# Patient Record
Sex: FEMALE | Race: WHITE | Employment: OTHER | URBAN - METROPOLITAN AREA
[De-identification: names, ages, dates, MRNs, and addresses within clinical notes are randomized per-mention and may not be internally consistent; named-entity substitution may affect disease eponyms.]

---

## 2018-04-04 ENCOUNTER — TRANSFERRED RECORDS (OUTPATIENT)
Dept: HEALTH INFORMATION MANAGEMENT | Facility: CLINIC | Age: 83
End: 2018-04-04

## 2018-04-19 ENCOUNTER — APPOINTMENT (OUTPATIENT)
Dept: GENERAL RADIOLOGY | Facility: CLINIC | Age: 83
End: 2018-04-19
Attending: EMERGENCY MEDICINE
Payer: MEDICARE

## 2018-04-19 ENCOUNTER — HOSPITAL ENCOUNTER (OUTPATIENT)
Facility: CLINIC | Age: 83
Setting detail: OBSERVATION
Discharge: HOME | End: 2018-04-20
Attending: EMERGENCY MEDICINE | Admitting: INTERNAL MEDICINE
Payer: MEDICARE

## 2018-04-19 DIAGNOSIS — I47.10 SVT (SUPRAVENTRICULAR TACHYCARDIA) (H): ICD-10-CM

## 2018-04-19 DIAGNOSIS — R07.89 OTHER CHEST PAIN: ICD-10-CM

## 2018-04-19 LAB
ANION GAP SERPL CALCULATED.3IONS-SCNC: 15 MMOL/L (ref 3–14)
BASOPHILS # BLD AUTO: 0.1 10E9/L (ref 0–0.2)
BASOPHILS NFR BLD AUTO: 0.5 %
BUN SERPL-MCNC: 25 MG/DL (ref 7–30)
CALCIUM SERPL-MCNC: 9.1 MG/DL (ref 8.5–10.1)
CHLORIDE SERPL-SCNC: 109 MMOL/L (ref 94–109)
CO2 SERPL-SCNC: 21 MMOL/L (ref 20–32)
CREAT SERPL-MCNC: 1 MG/DL (ref 0.52–1.04)
DIFFERENTIAL METHOD BLD: ABNORMAL
EOSINOPHIL # BLD AUTO: 0.3 10E9/L (ref 0–0.7)
EOSINOPHIL NFR BLD AUTO: 2.1 %
ERYTHROCYTE [DISTWIDTH] IN BLOOD BY AUTOMATED COUNT: 12.8 % (ref 10–15)
GFR SERPL CREATININE-BSD FRML MDRD: 53 ML/MIN/1.7M2
GLUCOSE SERPL-MCNC: 196 MG/DL (ref 70–99)
HCT VFR BLD AUTO: 42.1 % (ref 35–47)
HGB BLD-MCNC: 14.1 G/DL (ref 11.7–15.7)
IMM GRANULOCYTES # BLD: 0 10E9/L (ref 0–0.4)
IMM GRANULOCYTES NFR BLD: 0.3 %
LYMPHOCYTES # BLD AUTO: 2.3 10E9/L (ref 0.8–5.3)
LYMPHOCYTES NFR BLD AUTO: 19.8 %
MCH RBC QN AUTO: 30 PG (ref 26.5–33)
MCHC RBC AUTO-ENTMCNC: 33.5 G/DL (ref 31.5–36.5)
MCV RBC AUTO: 90 FL (ref 78–100)
MONOCYTES # BLD AUTO: 0.8 10E9/L (ref 0–1.3)
MONOCYTES NFR BLD AUTO: 6.4 %
NEUTROPHILS # BLD AUTO: 8.4 10E9/L (ref 1.6–8.3)
NEUTROPHILS NFR BLD AUTO: 70.9 %
NRBC # BLD AUTO: 0 10*3/UL
NRBC BLD AUTO-RTO: 0 /100
PLATELET # BLD AUTO: 272 10E9/L (ref 150–450)
POTASSIUM SERPL-SCNC: 4.3 MMOL/L (ref 3.4–5.3)
RBC # BLD AUTO: 4.7 10E12/L (ref 3.8–5.2)
SODIUM SERPL-SCNC: 145 MMOL/L (ref 133–144)
TROPONIN I SERPL-MCNC: 0.77 UG/L (ref 0–0.04)
TROPONIN I SERPL-MCNC: <0.015 UG/L (ref 0–0.04)
TSH SERPL DL<=0.005 MIU/L-ACNC: 3.38 MU/L (ref 0.4–4)
WBC # BLD AUTO: 11.8 10E9/L (ref 4–11)

## 2018-04-19 PROCEDURE — 96361 HYDRATE IV INFUSION ADD-ON: CPT | Performed by: EMERGENCY MEDICINE

## 2018-04-19 PROCEDURE — 93005 ELECTROCARDIOGRAM TRACING: CPT | Performed by: EMERGENCY MEDICINE

## 2018-04-19 PROCEDURE — 99285 EMERGENCY DEPT VISIT HI MDM: CPT | Mod: 25 | Performed by: EMERGENCY MEDICINE

## 2018-04-19 PROCEDURE — 36415 COLL VENOUS BLD VENIPUNCTURE: CPT | Performed by: STUDENT IN AN ORGANIZED HEALTH CARE EDUCATION/TRAINING PROGRAM

## 2018-04-19 PROCEDURE — 84443 ASSAY THYROID STIM HORMONE: CPT | Performed by: EMERGENCY MEDICINE

## 2018-04-19 PROCEDURE — 21400006 ZZH R&B CCU INTERMEDIATE UMMC

## 2018-04-19 PROCEDURE — 85025 COMPLETE CBC W/AUTO DIFF WBC: CPT | Performed by: EMERGENCY MEDICINE

## 2018-04-19 PROCEDURE — 25000128 H RX IP 250 OP 636

## 2018-04-19 PROCEDURE — 84484 ASSAY OF TROPONIN QUANT: CPT | Mod: 91 | Performed by: STUDENT IN AN ORGANIZED HEALTH CARE EDUCATION/TRAINING PROGRAM

## 2018-04-19 PROCEDURE — 93010 ELECTROCARDIOGRAM REPORT: CPT | Mod: Z6 | Performed by: EMERGENCY MEDICINE

## 2018-04-19 PROCEDURE — 84484 ASSAY OF TROPONIN QUANT: CPT | Performed by: EMERGENCY MEDICINE

## 2018-04-19 PROCEDURE — 25000132 ZZH RX MED GY IP 250 OP 250 PS 637: Performed by: EMERGENCY MEDICINE

## 2018-04-19 PROCEDURE — 71046 X-RAY EXAM CHEST 2 VIEWS: CPT

## 2018-04-19 PROCEDURE — 25000128 H RX IP 250 OP 636: Performed by: EMERGENCY MEDICINE

## 2018-04-19 PROCEDURE — 96374 THER/PROPH/DIAG INJ IV PUSH: CPT | Performed by: EMERGENCY MEDICINE

## 2018-04-19 PROCEDURE — 80048 BASIC METABOLIC PNL TOTAL CA: CPT | Performed by: EMERGENCY MEDICINE

## 2018-04-19 PROCEDURE — 25000125 ZZHC RX 250: Performed by: EMERGENCY MEDICINE

## 2018-04-19 PROCEDURE — 99291 CRITICAL CARE FIRST HOUR: CPT | Mod: 25 | Performed by: EMERGENCY MEDICINE

## 2018-04-19 RX ORDER — CARBOXYMETHYLCELLULOSE SODIUM 5 MG/ML
1 SOLUTION/ DROPS OPHTHALMIC AT BEDTIME
COMMUNITY

## 2018-04-19 RX ORDER — PRAVASTATIN SODIUM 80 MG/1
80 TABLET ORAL AT BEDTIME
COMMUNITY
Start: 2017-10-05

## 2018-04-19 RX ORDER — PRAVASTATIN SODIUM 40 MG
80 TABLET ORAL AT BEDTIME
Status: DISCONTINUED | OUTPATIENT
Start: 2018-04-19 | End: 2018-04-20 | Stop reason: HOSPADM

## 2018-04-19 RX ORDER — LEVOTHYROXINE SODIUM 25 UG/1
25 TABLET ORAL DAILY
Status: DISCONTINUED | OUTPATIENT
Start: 2018-04-20 | End: 2018-04-20 | Stop reason: HOSPADM

## 2018-04-19 RX ORDER — SODIUM CHLORIDE 9 MG/ML
INJECTION, SOLUTION INTRAVENOUS
Status: COMPLETED
Start: 2018-04-19 | End: 2018-04-19

## 2018-04-19 RX ORDER — ACETAMINOPHEN 325 MG/1
650 TABLET ORAL EVERY 4 HOURS PRN
Status: CANCELLED | OUTPATIENT
Start: 2018-04-19

## 2018-04-19 RX ORDER — ACETAMINOPHEN 325 MG/1
650 TABLET ORAL EVERY 4 HOURS PRN
Status: DISCONTINUED | OUTPATIENT
Start: 2018-04-19 | End: 2018-04-20 | Stop reason: HOSPADM

## 2018-04-19 RX ORDER — DILTIAZEM HYDROCHLORIDE 5 MG/ML
20 INJECTION INTRAVENOUS ONCE
Status: COMPLETED | OUTPATIENT
Start: 2018-04-19 | End: 2018-04-19

## 2018-04-19 RX ORDER — MAGNESIUM HYDROXIDE 400 MG/5ML
1 SUSPENSION, ORAL (FINAL DOSE FORM) ORAL DAILY
COMMUNITY
Start: 2012-05-02 | End: 2018-04-19

## 2018-04-19 RX ORDER — ACETAMINOPHEN 650 MG/1
650 SUPPOSITORY RECTAL EVERY 4 HOURS PRN
Status: DISCONTINUED | OUTPATIENT
Start: 2018-04-19 | End: 2018-04-20 | Stop reason: HOSPADM

## 2018-04-19 RX ORDER — VIT C/E/ZN/COPPR/LUTEIN/ZEAXAN 60 MG-6 MG
1 CAPSULE ORAL 2 TIMES DAILY
COMMUNITY

## 2018-04-19 RX ORDER — ASPIRIN 325 MG
325 TABLET ORAL ONCE
Status: COMPLETED | OUTPATIENT
Start: 2018-04-19 | End: 2018-04-19

## 2018-04-19 RX ORDER — TRAZODONE HYDROCHLORIDE 50 MG/1
50 TABLET, FILM COATED ORAL
COMMUNITY
Start: 2017-07-27 | End: 2018-07-27

## 2018-04-19 RX ORDER — MULTIVIT-MIN/IRON/FOLIC ACID/K 18-600-40
1 CAPSULE ORAL DAILY
COMMUNITY

## 2018-04-19 RX ORDER — LIDOCAINE 40 MG/G
CREAM TOPICAL
Status: CANCELLED | OUTPATIENT
Start: 2018-04-19

## 2018-04-19 RX ORDER — CALCIUM CARBONATE 500 MG/1
2 TABLET, CHEWABLE ORAL DAILY
COMMUNITY

## 2018-04-19 RX ORDER — IBUPROFEN 200 MG
400 TABLET ORAL EVERY 12 HOURS
COMMUNITY

## 2018-04-19 RX ORDER — NALOXONE HYDROCHLORIDE 0.4 MG/ML
.1-.4 INJECTION, SOLUTION INTRAMUSCULAR; INTRAVENOUS; SUBCUTANEOUS
Status: CANCELLED | OUTPATIENT
Start: 2018-04-19

## 2018-04-19 RX ORDER — LIDOCAINE 40 MG/G
CREAM TOPICAL
Status: DISCONTINUED | OUTPATIENT
Start: 2018-04-19 | End: 2018-04-20 | Stop reason: HOSPADM

## 2018-04-19 RX ORDER — TRAZODONE HYDROCHLORIDE 50 MG/1
50 TABLET, FILM COATED ORAL
Status: DISCONTINUED | OUTPATIENT
Start: 2018-04-19 | End: 2018-04-20 | Stop reason: HOSPADM

## 2018-04-19 RX ORDER — ACETAMINOPHEN 650 MG/1
650 SUPPOSITORY RECTAL EVERY 4 HOURS PRN
Status: CANCELLED | OUTPATIENT
Start: 2018-04-19

## 2018-04-19 RX ORDER — SODIUM PHOSPHATE,MONO-DIBASIC 19G-7G/118
1 ENEMA (ML) RECTAL DAILY
COMMUNITY

## 2018-04-19 RX ORDER — LEVOTHYROXINE SODIUM 25 UG/1
25 TABLET ORAL DAILY
COMMUNITY
Start: 2017-10-04 | End: 2018-10-04

## 2018-04-19 RX ORDER — ADENOSINE 3 MG/ML
INJECTION, SOLUTION INTRAVENOUS
Status: DISCONTINUED
Start: 2018-04-19 | End: 2018-04-19 | Stop reason: WASHOUT

## 2018-04-19 RX ADMIN — DILTIAZEM HYDROCHLORIDE 15 MG: 5 INJECTION INTRAVENOUS at 13:40

## 2018-04-19 RX ADMIN — ASPIRIN 325 MG ORAL TABLET 325 MG: 325 PILL ORAL at 13:40

## 2018-04-19 RX ADMIN — SODIUM CHLORIDE 1000 ML: 9 INJECTION, SOLUTION INTRAVENOUS at 15:14

## 2018-04-19 RX ADMIN — SODIUM CHLORIDE 1000 ML: 9 INJECTION, SOLUTION INTRAVENOUS at 13:31

## 2018-04-19 ASSESSMENT — ENCOUNTER SYMPTOMS: LIGHT-HEADEDNESS: 1

## 2018-04-19 NOTE — PHARMACY-ADMISSION MEDICATION HISTORY
Admission medication history for the April 19, 2018 admission is complete.     Interview sources:  Patient, [facility] (Mail Order; [phone])    Reliability of source: Moderate - patient knew the names of most medications, but was unsure of doses; verified all with  pharmacy and care everywhere    Medication compliance: good - per patient report    Changes made to PTA medication list (reason)  Added: all medications listed (14 total)  Deleted: prednisolone eye drops (therapy complete in 2015)  Changed: none    Additional medication history information:   - Patient is prescribed 4 prescription medications (sertraline, pravastatin, levothyroxine, trazodone), all others listed below are purchases OTC  - Eylea - pt reports receiving an injection monthly for her eye, this is managed by [facility]       Prior to Admission medications    Medication Sig Last Dose Taking? Auth Provider   Aflibercept (EYLEA) 2 MG/0.05ML SOLN injection 2 mg by Intravitreal route every 28 days (Injection given in clinic - managed by [facility]) Past Month Yes Unknown, Entered By History   calcium carbonate (TUMS) 500 MG chewable tablet Take 2 chew tab by mouth daily (for calcium supplementation) 4/19/2018 at AM Yes Unknown, Entered By History   Carboxymethylcellulose Sod PF (REFRESH PLUS) 0.5 % SOLN ophthalmic solution Place 1 drop into both eyes At Bedtime 4/18/2018 at PM Yes Unknown, Entered By History   glucosamine-chondroitin 500-400 MG CAPS per capsule Take 1 capsule by mouth daily 4/19/2018 at AM Yes Unknown, Entered By History   ibuprofen (ADVIL/MOTRIN) 200 MG tablet Take 400 mg by mouth every 12 hours 4/19/2018 at AM Yes Unknown, Entered By History   levothyroxine (SYNTHROID/LEVOTHROID) 25 MCG tablet Take 25 mcg by mouth daily 4/19/2018 at AM Yes Unknown, Entered By History   Multiple Vitamins-Minerals (CENTRUM SILVER 50+WOMEN) TABS Take 1 tablet by mouth daily 4/19/2018 at AM Yes Unknown,  Entered By History   multivitamin  with lutein (OCUVITE WITH LTEIN) CAPS per capsule Take 1 capsule by mouth 2 times daily (patient purchases TEBS brand eye vitamins)  4/19/2018 at AM Yes Unknown, Entered By History   Omega-3 Fatty Acids (FISH OIL OMEGA-3 PO) Take 1 capsule by mouth daily 4/19/2018 at AM Yes Unknown, Entered By History   polyethylene glycol 0.4%- propylene glycol 0.3% (SYSTANE ULTRA) 0.4-0.3 % SOLN ophthalmic solution Place 1 drop into both eyes every morning 4/19/2018 at AM Yes Unknown, Entered By History   pravastatin (PRAVACHOL) 80 MG tablet Take 80 mg by mouth At Bedtime 4/18/2018 at PM Yes Unknown, Entered By History   sertraline (ZOLOFT) 50 MG tablet Take 50 mg by mouth every morning 4/19/2018 at AM Yes Unknown, Entered By History   traZODone (DESYREL) 50 MG tablet Take 50 mg by mouth nightly as needed for sleep 4/18/2018 at PM Yes Unknown, Entered By History   Vitamin D, Cholecalciferol, 1000 units TABS Take 1 tablet by mouth daily 4/19/2018 at AM Yes Unknown, Entered By History       Time spent: 50 minutes    Medication history completed by:   Filomena Palma, PharmD  [facility] [place]  Available daily from 1-9 PM: phone [phone], pager [phone]

## 2018-04-19 NOTE — IP AVS SNAPSHOT
MRN:[id number]                      After Visit Summary   4/19/2018    Guido Larkin    MRN: [id number]           Thank you!     Thank you for choosing [facility] for your care. Our goal is always to provide you with excellent care. Hearing back from our patients is one way we can continue to improve our services. Please take a few minutes to complete the written survey that you may receive in the mail after you visit with us. Thank you!        Patient Information     Date Of Birth          1935        Designated Caregiver       Most Recent Value    Caregiver    Will someone help with your care after discharge? yes    Name of designated caregiver Nasreen    Phone number of caregiver [phone]    Caregiver address [address] [facility] [address]      About your hospital stay     You were admitted on:  April 19, 2018 You last received care in the:  [address] [facility]    You were discharged on:  April 20, 2018        Reason for your hospital stay       SVT    You were brought to the hospital because you had a fast heart rate. This converted out on its own by taking a deep breath. This sometimes happens with the type of rhythm you had and likely you were born with it. Tricks to be done if it happens again are: drinking a glass of cold water, taking a deep breath, bearing down, splashing cold water on your face. We started a medication called metoprolol 25mg once a day. Take it at night if it is making you tired. It helps the heart rate go slower.                  Who to Call     For medical emergencies, please call 911.  For non-urgent questions about your medical care, please call your primary care provider or clinic, [phone]          Attending Provider     Provider Specialty    Hany Lofton MD Emergency Medicine    Penelope Simmosn MD Cardiology       Primary Care Provider Office Phone # Fax #    Sanjuanita Irving [phone]      After Care Instructions   "   Activity       Your activity upon discharge: activity as tolerated            Diet       Follow this diet upon discharge: Orders Placed This Encounter      Regular Diet Adult                  Follow-up Appointments     Adult [facility] Follow-up and recommended labs and tests       Follow up with Dr. Penelope Simmons , at (location with clinic name or city) [facility], within a month  to evaluate medication change. No follow up labs or test are needed.    Appointments on [place] and/or [facility] (with [facility] or [facility] provider or service). Call [phone] if you haven't heard regarding these appointments within 7 days of discharge.                  Pending Results     No orders found for last 3 day(s).            Statement of Approval     Ordered          04/20/18 1148  I have reviewed and agree with all the recommendations and orders detailed in this document.  EFFECTIVE NOW     Approved and electronically signed by:  Leonila Leyva MD             Admission Information     Date & Time Provider Department Dept. Phone    4/19/2018 Penelope Simmons MD [address] [facility] [place] [phone]      Your Vitals Were     Blood Pressure Pulse Temperature Respirations Height Weight    128/61 (BP Location: Left arm) 97 98.5  F (36.9  C) (Oral) 18 1.707 m (5' 7.2\") 65.2 kg (143 lb 11.2 oz)    Pulse Oximetry BMI (Body Mass Index)                90% 22.37 kg/m2          [employer] Information     [employer] lets you send messages to your doctor, view your test results, renew your prescriptions, schedule appointments and more. To sign up, go to [email or web address] . Click on \"Log in\" on the left side of the screen, which will take you to the Welcome page. Then click on \"Sign up Now\" on the right side of the page.     You will be asked to enter the access code listed below, as well as some personal information. Please follow the directions to create your username and password.     Your access code is: [email or web address]  Expires: " 2018 11:50 AM     Your access code will  in 90 days. If you need help or a new code, please call your [facility] clinic or [phone].        Care EveryWhere ID     This is your Care EveryWhere ID. This could be used by other organizations to access your [facility] medical records  [id number]        Equal Access to Services     STEPHANIELALY ROBY : Hadii solo kumari hadcucao Soyueali, waaxda luqadaha, qaybta kaalmada samanthacesiliaalfie, alana leonolgacholo maddox . So [facility] [phone].    ATENCIÓN: Si habla español, tiene a gtz disposición servicios gratuitos de asistencia lingüística. Violette al [phone].    We comply with applicable federal civil rights laws and [place] laws. We do not discriminate on the basis of race, color, national origin, age, disability, sex, sexual orientation, or gender identity.               Review of your medicines      START taking        Dose / Directions    metoprolol succinate 25 MG 24 hr tablet   Commonly known as:  TOPROL-XL        Dose:  25 mg   Start taking on:  2018   Take 1 tablet (25 mg) by mouth daily   Quantity:  30 tablet   Refills:  3         CONTINUE these medicines which have NOT CHANGED        Dose / Directions    calcium carbonate 500 MG chewable tablet   Commonly known as:  TUMS        Dose:  2 chew tab   Take 2 chew tab by mouth daily (for calcium supplementation)   Refills:  0       Carboxymethylcellulose Sod PF 0.5 % Soln ophthalmic solution   Commonly known as:  REFRESH PLUS        Dose:  1 drop   Place 1 drop into both eyes At Bedtime   Refills:  0       * CENTRUM SILVER 50+WOMEN Tabs        Dose:  1 tablet   Take 1 tablet by mouth daily   Refills:  0       * multivitamin  with lutein Caps per capsule        Dose:  1 capsule   Take 1 capsule by mouth 2 times daily (patient purchases TEBS brand eye vitamins)   Refills:  0       EYLEA 2 MG/0.05ML Soln injection   Generic drug:  Aflibercept        Dose:  2 mg   2 mg by Intravitreal route every 28 days  (Injection given in clinic - managed by [facility])   Refills:  0       FISH OIL OMEGA-3 PO        Dose:  1 capsule   Take 1 capsule by mouth daily   Refills:  0       glucosamine-chondroitin 500-400 MG Caps per capsule        Dose:  1 capsule   Take 1 capsule by mouth daily   Refills:  0       ibuprofen 200 MG tablet   Commonly known as:  ADVIL/MOTRIN        Dose:  400 mg   Take 400 mg by mouth every 12 hours   Refills:  0       levothyroxine 25 MCG tablet   Commonly known as:  SYNTHROID/LEVOTHROID        Dose:  25 mcg   Take 25 mcg by mouth daily   Refills:  0       polyethylene glycol 0.4%- propylene glycol 0.3% 0.4-0.3 % Soln ophthalmic solution   Commonly known as:  SYSTANE ULTRA        Dose:  1 drop   Place 1 drop into both eyes every morning   Refills:  0       pravastatin 80 MG tablet   Commonly known as:  PRAVACHOL        Dose:  80 mg   Take 80 mg by mouth At Bedtime   Refills:  0       sertraline 50 MG tablet   Commonly known as:  ZOLOFT        Dose:  50 mg   Take 50 mg by mouth every morning   Refills:  0       traZODone 50 MG tablet   Commonly known as:  DESYREL        Dose:  50 mg   Take 50 mg by mouth nightly as needed for sleep   Refills:  0       Vitamin D (Cholecalciferol) 1000 units Tabs        Dose:  1 tablet   Take 1 tablet by mouth daily   Refills:  0       * Notice:  This list has 2 medication(s) that are the same as other medications prescribed for you. Read the directions carefully, and ask your doctor or other care provider to review them with you.         Where to get your medicines      Some of these will need a paper prescription and others can be bought over the counter. Ask your nurse if you have questions.     Bring a paper prescription for each of these medications     metoprolol succinate 25 MG 24 hr tablet                Protect others around you: Learn how to safely use, store and throw away your medicines at www.disposemymeds.org.             Medication List: This  is a list of all your medications and when to take them. Check marks below indicate your daily home schedule. Keep this list as a reference.      Medications           Morning Afternoon Evening Bedtime As Needed    calcium carbonate 500 MG chewable tablet   Commonly known as:  TUMS   Take 2 chew tab by mouth daily (for calcium supplementation)                                Carboxymethylcellulose Sod PF 0.5 % Soln ophthalmic solution   Commonly known as:  REFRESH PLUS   Place 1 drop into both eyes At Bedtime                                * CENTRUM SILVER 50+WOMEN Tabs   Take 1 tablet by mouth daily                                * multivitamin  with lutein Caps per capsule   Take 1 capsule by mouth 2 times daily (patient purchases [employer] brand eye vitamins)                                EYLEA 2 MG/0.05ML Soln injection   2 mg by Intravitreal route every 28 days (Injection given in clinic - managed by [facility])   Generic drug:  Aflibercept                                FISH OIL OMEGA-3 PO   Take 1 capsule by mouth daily                                glucosamine-chondroitin 500-400 MG Caps per capsule   Take 1 capsule by mouth daily                                ibuprofen 200 MG tablet   Commonly known as:  ADVIL/MOTRIN   Take 400 mg by mouth every 12 hours                                levothyroxine 25 MCG tablet   Commonly known as:  SYNTHROID/LEVOTHROID   Take 25 mcg by mouth daily   Last time this was given:  25 mcg on 4/20/2018  8:03 AM                                metoprolol succinate 25 MG 24 hr tablet   Commonly known as:  TOPROL-XL   Take 1 tablet (25 mg) by mouth daily   Start taking on:  4/21/2018   Last time this was given:  25 mg on 4/20/2018  8:03 AM                                polyethylene glycol 0.4%- propylene glycol 0.3% 0.4-0.3 % Soln ophthalmic solution   Commonly known as:  SYSTANE ULTRA   Place 1 drop into both eyes every morning                                 pravastatin 80 MG tablet   Commonly known as:  PRAVACHOL   Take 80 mg by mouth At Bedtime                                sertraline 50 MG tablet   Commonly known as:  ZOLOFT   Take 50 mg by mouth every morning   Last time this was given:  50 mg on 4/20/2018  8:03 AM                                traZODone 50 MG tablet   Commonly known as:  DESYREL   Take 50 mg by mouth nightly as needed for sleep   Last time this was given:  50 mg on 4/20/2018 12:11 AM                                Vitamin D (Cholecalciferol) 1000 units Tabs   Take 1 tablet by mouth daily                                * Notice:  This list has 2 medication(s) that are the same as other medications prescribed for you. Read the directions carefully, and ask your doctor or other care provider to review them with you.

## 2018-04-19 NOTE — H&P
"General acute hospital, [place]    History and Physical  Cardiology 1     Date of Admission:  4/19/2018    Assessment & Plan   Guido Larkin is a 83 year old female with history of hypothyroidism who presented with chest pressure, jaw pressure and pre-syncope, found to have SVT, which converted with vagal manuever.      Presyncope, Chest Pressure 2/2 SVT  Demand ischemia  - Unclear trigger for SVT.  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  TSH normal.   - Telemetry  - Troponin peaked, likely from SVT.    - Formal echo pending.  Bedside echo shows normal EF.    - Start metoprolol tart 25    Chronic issues:  HLD: Statin, asa 325  Anxiety: SSRI  Macular degeneration: Eye drops   Hypothyroidism: LT4 25mcg  Insomnia: trazodone    FEN: Regular  CODE: Full code  PPX: Ambulate  DISPO: Observation, anticipate dc in AM     To be discussed in the AM.    Iggy Kahn DO, MS  Internal Medicine, PGY-2  Pager [phone]      =======================================================================  =======================================================================  =======================================================================    Primary Care Physician   Sanjuanita Irving    Chief Complaint   Sudden onset shortness of breath, chest pain, dizziness    History is obtained from the patient    History of Present Illness   Guido Larkin is a 83 year old female with hypothyroidism who presents with abrupt onset shortness chest pressure and feeling like she was going to \"pass out\".  She felt the chest pressure in upper chest and below her jaw.  She  Went to get a massage thinking that would help her.  However, that didn't help.  Then she came to the ED.  There took some really deep breaths, on her own, and spontaneously converted to NSR.  She says she has otherwise been in her usual state of health.  This morning before this episode she had strained to have bowel movement but she " always has to strain to have bowel movement and this morning was not unusual.  Otherwise, can't think of anything different.       A complete ROS is otherwise negative.     Past Medical History    Reviewed    Past Surgical History   Reviewed    Meds:  Reviewed    Allergies:   reviewed    Social History   Very self sufficient and active -- [employer] in [place] winter and [place] in the summer.  Avid golfer.  Quit smoking 40 years ago.  Drinks 1.5 shot of scotch every day.  No illicits.       Family History    No FH of arrhythmia.       Review of Systems   The 10 point Review of Systems is negative other than noted in the HPI.     Physical Exam       BP: 102/55 Pulse: 97 Heart Rate: 60 Resp: 24 SpO2: 98 % O2 Device: None (Room air)    Vital Signs with Ranges  Pulse:  [97] 97  Heart Rate:  [] 60  Resp:  [11-27] 24  BP: ()/(51-75) 102/55  SpO2:  [81 %-100 %] 98 %  0 lbs 0 oz    Constitutional: NAD  Eyes: Anicteric  ENT: No JVD  Respiratory: bibasilar crackles, but comfortable on RA  Cardiovascular: RRR,   Bedside US: normal EF, no effusion, IVC not dilated and with good resp variation  GI: soft, NTND  Skin: no rash  Neurologic: Grossly non-focal  Neuropsychiatric: Alert and oriented    Labs:  Trop peaked at 0.769, CXR ok, EKG: SVT and then sinus arrhythmia

## 2018-04-19 NOTE — IP AVS SNAPSHOT
[address] [address]    Phone:  [phone]                                       After Visit Summary   4/19/2018    Guido Larkin    MRN: [id number]           After Visit Summary Signature Page     I have received my discharge instructions, and my questions have been answered. I have discussed any challenges I see with this plan with the nurse or doctor.    ..........................................................................................................................................  Patient/Patient Representative Signature      ..........................................................................................................................................  Patient Representative Print Name and Relationship to Patient    ..................................................               ................................................  Date                                            Time    ..........................................................................................................................................  Reviewed by Signature/Title    ...................................................              ..............................................  Date                                                            Time

## 2018-04-19 NOTE — LETTER
"Transition Communication Hand-off for Care Transitions to Next Level of Care Provider    Name: Guido Larkin  : 1935  MRN #: [id number]  Primary Care Provider: Sanjuanita Irving     [address]  [facility] [address]     Reason for Hospitalization:  Other chest pain [R07.89]  SVT (supraventricular tachycardia) (H) [I47.1]  Admit Date/Time: 2018  1:07 PM  Discharge Date: ***  Payor Source: Payor: [facility] / Plan: [employer] PLAN / Product Type: HMO /     Readmission Assessment Measure (DAI) Risk Score/category: ***    Plan of Care Goals/Milestone Events:   Patient Concerns: ***   Patient Goals:   Short-term ***   Long-term ***   Medical Goals   Short-term ***   Long-term ***         Reason for Communication Hand-off Referral: {CCREASONCMCTNHANDOFFREFERRALCTS:87539}    Discharge Plan:       Concern for non-adherence with plan of care:   Y/N ***  Discharge Needs Assessment:      Already enrolled in Tele-monitoring program and name of program:  ***  Follow-up specialty is recommended: {YES / NO:48047::\"Yes\"}    Follow-up plan:  No future appointments.    Any outstanding tests or procedures:              Key Recommendations:      Leana Lazcano    AVS/Discharge Summary is the source of truth; this is a helpful guide for improved communication of patient story          "

## 2018-04-19 NOTE — ED PROVIDER NOTES
"    [facility] EMERGENCY DEPARTMENT ([facility])    4/19/18     ED 1  1:12 PM   History     Chief Complaint   Patient presents with     Chest Pain     disoriented, general malaise, SOB, near syncope, chest heaviness; started 1 hour ago      The history is provided by the patient and medical records.     Guido Larkin is a 83 year old female who presents with sense of generalized malaise and shortness of breath that started approximately 1 hour prior to arrival (noon today).  She has a history of hyperlipidemia, hypothyroidism, depression and anxiety. She notes an episode of heart racing and panic attack while vacationing in [place]. She states symptoms lasted for 2 days and then spontaneously resolved at that time. She did not get these symptoms evaluated. At around noon today she noticed progressively worsening lightheadedness, near syncope and chest heaviness. She is able to breathe deep \"but it's not deep deep.\" No shortness of breath otherwise. She denies any inadvertent medication overdose. She does drink 1-1/2 of good scotch every day but no other substance use. She is not on any anticoagulation. No prior cardiac history. She has some seasonal allergies, is otherwise healthy. She does receive injections for macular degeneration.     Patient has primary care at [facility], had routine physical exam 11 days ago that was normal.     I have reviewed the Medications, Allergies, Past Medical and Surgical History, and Social History in the Epic system.    Review of Systems   Cardiovascular: Positive for chest pain (chest pressure).   Neurological: Positive for light-headedness.       Physical Exam   BP: 93/75  Pulse: 97  Heart Rate: 194  Temp: 98.9  F (37.2  C)  Resp: 18  Height: 170.7 cm (5' 7.2\")  Weight: 66.7 kg (147 lb)  SpO2: 94 %      Physical Exam Exam:  Constitutional: healthy, alert and no distress  Head: Normocephalic. No masses, lesions, tenderness or abnormalities  Neck: Neck supple. No " adenopathy. Thyroid symmetric, normal size,, Carotids without bruits.  ENT: ENT exam normal, no neck nodes or sinus tenderness  Cardiovascular: Tachy and regular otherwise negative, PMI normal. No lifts, heaves, or thrills. No murmurs, clicks gallops or rub  Respiratory: negative, Percussion normal. Good diaphragmatic excursion. Lungs clear  Gastrointestinal: Abdomen soft, non-tender. BS normal. No masses, organomegaly  : Deferred  Musculoskeletal: extremities normal- no gross deformities noted, gait normal and normal muscle tone  Skin: no suspicious lesions or rashes  Neurologic: Gait normal. Reflexes normal and symmetric. Sensation grossly WNL.  Psychiatric: mentation appears normal and affect normal/bright  Hematologic/Lymphatic/Immunologic: Normal cervical lymph nodes      ED Course     ED Course     Procedures             EKG Interpretation:      Interpreted by Hany Lofton MD  Time reviewed: 1336  Symptoms at time of EKG: lightheadedness, near syncope   Rhythm: sinus arrhythmia  Rate: 73  Axis: Normal  Ectopy: none  Conduction: normal  ST Segments/ T Waves: No ST-T wave changes  Q Waves: none  Comparison to prior: No old EKG available    Clinical Impression: sinus arrhythmia    Critical Care time:  was 30 minutes for this patient excluding procedures.       Labs Ordered and Resulted from Time of ED Arrival Up to the Time of Departure from the ED   CBC WITH PLATELETS DIFFERENTIAL - Abnormal; Notable for the following:        Result Value    WBC 11.8 (*)     Absolute Neutrophil 8.4 (*)     All other components within normal limits   BASIC METABOLIC PANEL - Abnormal; Notable for the following:     Sodium 145 (*)     Anion Gap 15 (*)     Glucose 196 (*)     GFR Estimate 53 (*)     All other components within normal limits   TROPONIN I - Abnormal; Notable for the following:     Troponin I ES 0.769 (*)     All other components within normal limits   TROPONIN I   TSH WITH FREE T4 REFLEX            Assessments & Plan  (with Medical Decision Making)   This is a 83-year-old female with no prior medical history except for hypercholesterolemia and hypothyroid for which she takes her medications and has had no recent changes.  For about 1 hour she is having this palpitations and chest pain described as chest heaviness.  She is also feeling some shortness of breath as well as lightheadedness.  She describes no recent history of illness.  Physical exam is unremarkable except for tachycardia.    Given her history and current findings I am concerned for SVT.  In fact, EKG shows SVT and heart rate around 180s-190s.  While in the middle of evaluation, patient was able to break her SVT on her own.  Repeat EKG does show evidence of sinus rhythm with rate in the 70s.  Her initial EKG was concerning for inferior lateral ischemia  with a heart rate in the 180s-190s.  I am concerned for possible SVT  recurrence as well as possible stable angina.  Patient will be admitted to Cardiology.    I have reviewed the nursing notes.    I have reviewed the findings, diagnosis, plan and need for follow up with the patient.    Current Discharge Medication List          Final diagnoses:   Other chest pain   SVT (supraventricular tachycardia) (H)     Jaclyn HOPPER, am serving as a trained medical scribe to document services personally performed by Hany Lofton MD based on the provider's statements to me on April 19, 2018.  This document has been checked and approved by the attending provider.    Hany HOPPER MD, was physically present and have reviewed and verified the accuracy of this note documented by Jaclyn Hunt medical scribe.     4/19/2018   [facility], [place], EMERGENCY DEPARTMENT     Hany Lofton MD  04/20/18 1038

## 2018-04-20 VITALS
BODY MASS INDEX: 22.55 KG/M2 | HEIGHT: 67 IN | HEART RATE: 97 BPM | WEIGHT: 143.7 LBS | RESPIRATION RATE: 18 BRPM | TEMPERATURE: 98.5 F | OXYGEN SATURATION: 90 % | DIASTOLIC BLOOD PRESSURE: 61 MMHG | SYSTOLIC BLOOD PRESSURE: 128 MMHG

## 2018-04-20 LAB
ANION GAP SERPL CALCULATED.3IONS-SCNC: 6 MMOL/L (ref 3–14)
BUN SERPL-MCNC: 16 MG/DL (ref 7–30)
CALCIUM SERPL-MCNC: 8.3 MG/DL (ref 8.5–10.1)
CHLORIDE SERPL-SCNC: 112 MMOL/L (ref 94–109)
CO2 SERPL-SCNC: 25 MMOL/L (ref 20–32)
CREAT SERPL-MCNC: 0.64 MG/DL (ref 0.52–1.04)
GFR SERPL CREATININE-BSD FRML MDRD: 90 ML/MIN/1.7M2
GLUCOSE SERPL-MCNC: 85 MG/DL (ref 70–99)
INTERPRETATION ECG - MUSE: NORMAL
INTERPRETATION ECG - MUSE: NORMAL
POTASSIUM SERPL-SCNC: 4 MMOL/L (ref 3.4–5.3)
SODIUM SERPL-SCNC: 144 MMOL/L (ref 133–144)
TROPONIN I SERPL-MCNC: 0.74 UG/L (ref 0–0.04)

## 2018-04-20 PROCEDURE — 25000132 ZZH RX MED GY IP 250 OP 250 PS 637: Mod: GY | Performed by: STUDENT IN AN ORGANIZED HEALTH CARE EDUCATION/TRAINING PROGRAM

## 2018-04-20 PROCEDURE — A9270 NON-COVERED ITEM OR SERVICE: HCPCS | Mod: GY | Performed by: STUDENT IN AN ORGANIZED HEALTH CARE EDUCATION/TRAINING PROGRAM

## 2018-04-20 PROCEDURE — 25000132 ZZH RX MED GY IP 250 OP 250 PS 637: Mod: GY | Performed by: INTERNAL MEDICINE

## 2018-04-20 PROCEDURE — G0378 HOSPITAL OBSERVATION PER HR: HCPCS

## 2018-04-20 PROCEDURE — A9270 NON-COVERED ITEM OR SERVICE: HCPCS | Mod: GY | Performed by: INTERNAL MEDICINE

## 2018-04-20 PROCEDURE — 80048 BASIC METABOLIC PNL TOTAL CA: CPT | Performed by: STUDENT IN AN ORGANIZED HEALTH CARE EDUCATION/TRAINING PROGRAM

## 2018-04-20 PROCEDURE — 36415 COLL VENOUS BLD VENIPUNCTURE: CPT | Performed by: STUDENT IN AN ORGANIZED HEALTH CARE EDUCATION/TRAINING PROGRAM

## 2018-04-20 RX ORDER — METOPROLOL SUCCINATE 25 MG/1
25 TABLET, EXTENDED RELEASE ORAL DAILY
Status: DISCONTINUED | OUTPATIENT
Start: 2018-04-20 | End: 2018-04-20 | Stop reason: HOSPADM

## 2018-04-20 RX ORDER — METOPROLOL SUCCINATE 25 MG/1
25 TABLET, EXTENDED RELEASE ORAL DAILY
Qty: 30 TABLET | Refills: 3 | Status: SHIPPED | OUTPATIENT
Start: 2018-04-21 | End: 2018-04-20

## 2018-04-20 RX ORDER — METOPROLOL SUCCINATE 25 MG/1
25 TABLET, EXTENDED RELEASE ORAL DAILY
Qty: 30 TABLET | Refills: 3 | Status: SHIPPED | OUTPATIENT
Start: 2018-04-21 | End: 2018-06-20

## 2018-04-20 RX ADMIN — METOPROLOL SUCCINATE 25 MG: 25 TABLET, EXTENDED RELEASE ORAL at 08:03

## 2018-04-20 RX ADMIN — SERTRALINE HYDROCHLORIDE 50 MG: 50 TABLET ORAL at 08:03

## 2018-04-20 RX ADMIN — TRAZODONE HYDROCHLORIDE 50 MG: 50 TABLET ORAL at 00:11

## 2018-04-20 RX ADMIN — LEVOTHYROXINE SODIUM 25 MCG: 25 TABLET ORAL at 08:03

## 2018-04-20 NOTE — PLAN OF CARE
Pt admitted from ED at [facility] with C/O chest heaviness,SOB,malaise and lightheadedness.Was found to be a SVT,but self resolved after pt did deep breathing.Troponin .769,so pt admitted here for monitoring.Pt had echo also in room.No other tx ,next troponin to be drawn at 2300.Continue with POC.Pt VSS,NSR.

## 2018-04-20 NOTE — PLAN OF CARE
Problem: Patient Care Overview  Goal: Plan of Care/Patient Progress Review  Outcome: Improving  Shift Summary    Observed mostly asleep overnight. Always denied having any pain, aches, or discomfort. Very pleasant, cooperative, appreciative and agreeable. Vital signs been stable. Been on SB-NSR with occassional PAC's. No HOLM. No issue this shift.

## 2018-04-20 NOTE — DISCHARGE SUMMARY
"Physician Discharge Summary     Patient ID:  Guido Larkin  [id number]  83 year old  1935    Admit date: 4/19/2018    Discharge date and time: 4/20/2018     Admitting Physician: Penelope Simmons MD     Discharge Physician: Leonila Smith    Admission Diagnoses: Other chest pain [R07.89]  SVT (supraventricular tachycardia) (H) [I47.1]    Discharge Diagnoses: SVT    Admission Condition: good    Discharged Condition: good    Indication for Admission: Tachycardia, elevated troponin    Hospital Course: This is an 82 yo active female with a past medical history of breast cancer who presented to an OSH ED with some chest pressure found to be in SVT likely AVNRT which resolved with taking a deep breath. Troponins were drawn and peaked at 0.76. She felt back to baseline after the episode and denies any other chest pain or shortness of breath and is active able to do more than 4 METS daily. She was discharged on 25mg of metoprolol XL to follow up with EP.     Consults: none      Treatments: beta blocker    Discharge Exam:  /61 (BP Location: Left arm)  Pulse 97  Temp 98.5  F (36.9  C) (Oral)  Resp 18  Ht 1.707 m (5' 7.2\")  Wt 65.2 kg (143 lb 11.2 oz)  SpO2 90%  BMI 22.37 kg/m2  Alert in no acute distress  No JVD  RRR no murmur  CTAB  Abdomen non tender non distended  No LE edema symmetric pulses warm well perfused extremities  Neuro grossly intact    Disposition: home    Patient Instructions:   Current Discharge Medication List      START taking these medications    Details   metoprolol succinate (TOPROL-XL) 25 MG 24 hr tablet Take 1 tablet (25 mg) by mouth daily  Qty: 30 tablet, Refills: 3    Associated Diagnoses: SVT (supraventricular tachycardia) (H)         CONTINUE these medications which have NOT CHANGED    Details   Aflibercept (EYLEA) 2 MG/0.05ML SOLN injection 2 mg by Intravitreal route every 28 days (Injection given in clinic - managed by [facility] opthalmology)      calcium " carbonate (TUMS) 500 MG chewable tablet Take 2 chew tab by mouth daily (for calcium supplementation)      Carboxymethylcellulose Sod PF (REFRESH PLUS) 0.5 % SOLN ophthalmic solution Place 1 drop into both eyes At Bedtime      glucosamine-chondroitin 500-400 MG CAPS per capsule Take 1 capsule by mouth daily      ibuprofen (ADVIL/MOTRIN) 200 MG tablet Take 400 mg by mouth every 12 hours      levothyroxine (SYNTHROID/LEVOTHROID) 25 MCG tablet Take 25 mcg by mouth daily      Multiple Vitamins-Minerals (CENTRUM SILVER 50+WOMEN) TABS Take 1 tablet by mouth daily      multivitamin  with lutein (OCUVITE WITH LTEIN) CAPS per capsule Take 1 capsule by mouth 2 times daily (patient purchases [employer] brand eye vitamins)       Omega-3 Fatty Acids (FISH OIL OMEGA-3 PO) Take 1 capsule by mouth daily      polyethylene glycol 0.4%- propylene glycol 0.3% (SYSTANE ULTRA) 0.4-0.3 % SOLN ophthalmic solution Place 1 drop into both eyes every morning      pravastatin (PRAVACHOL) 80 MG tablet Take 80 mg by mouth At Bedtime      sertraline (ZOLOFT) 50 MG tablet Take 50 mg by mouth every morning      traZODone (DESYREL) 50 MG tablet Take 50 mg by mouth nightly as needed for sleep      Vitamin D, Cholecalciferol, 1000 units TABS Take 1 tablet by mouth daily           Activity: activity as tolerated  Diet: regular diet  Wound Care: none needed      Signed:  Leonila Smith  4/20/2018  11:48 AM

## 2018-04-20 NOTE — PLAN OF CARE
Problem: Patient Care Overview  Goal: Plan of Care/Patient Progress Review  Outcome: Adequate for Discharge Date Met: 04/20/18  DISCHARGE                      ----------------------------------------------------------------------------  Discharged to: Home  Via: private transportation  Accompanied by: Spouse  Discharge Instructions: Reg diet, activity as tolerated, dysrhythmia maintenance while at home, medications, follow up appointments, when to call the MD, aftercare instructions.  Prescriptions: To be filled by [facility]; medication list reviewed & sent with pt  Follow Up Appointments:  information given  Belongings: All sent with pt  IV: d/c'd  Telemetry: d/c'd  Pt exhibits understanding of above discharge instructions; all questions answered. Pt left in wheelchair, taken down to front lobby by spouse.    Discharge Paperwork: Signed, copied, and sent home with patient.

## 2018-04-22 ENCOUNTER — CARE COORDINATION (OUTPATIENT)
Dept: CARE COORDINATION | Facility: CLINIC | Age: 83
End: 2018-04-22

## 2018-04-24 ENCOUNTER — CARE COORDINATION (OUTPATIENT)
Dept: CARDIOLOGY | Facility: CLINIC | Age: 83
End: 2018-04-24

## 2018-04-27 NOTE — PROGRESS NOTES
Tell me how you have been doing since you were discharged from the hospital.       ACTIVITY  How is your activity tolerance?  Doing good. No SOB, chest pain, or palpations.         ASSISTANCE  Do you have someone at home to assist you with your daily activities? Yes      MEDICATIONS  I would like to review your discharge medications and answer any questions you may have about them and also make sure you have all the medications that are new to you, and discuss any changes that were made to your pre-hospital medications.     Is someone helping you to set up your medications? She does her own medications. States she is a little fatigue but taking the metoprolol at night to help.       FOLLOW UP  Do you have a follow up appointment with your provider?   What other discharge instructions do you have?   Are you to get labs, procedures or tests before you see your provider?      Patient is coming to see Dr. Penelope Simmons 05/15/2018        CONTACT INFORMATION  Please feel free to call us with any other questions or symptoms that are concerning for you at [phone], if it is after 4:30 in the afternoon, or a weekend please call [phone] and ask for the on call specialist.  We want to do everything we can to help prevent you needing to return to the ED, so please do not hesitate to call us.

## 2018-05-15 ENCOUNTER — OFFICE VISIT (OUTPATIENT)
Dept: CARDIOLOGY | Facility: CLINIC | Age: 83
End: 2018-05-15
Attending: INTERNAL MEDICINE
Payer: MEDICARE

## 2018-05-15 VITALS
HEIGHT: 66 IN | BODY MASS INDEX: 22.87 KG/M2 | DIASTOLIC BLOOD PRESSURE: 70 MMHG | HEART RATE: 60 BPM | SYSTOLIC BLOOD PRESSURE: 132 MMHG | WEIGHT: 142.3 LBS | OXYGEN SATURATION: 97 %

## 2018-05-15 DIAGNOSIS — I47.10 SVT (SUPRAVENTRICULAR TACHYCARDIA) (H): Primary | ICD-10-CM

## 2018-05-15 PROCEDURE — 99214 OFFICE O/P EST MOD 30 MIN: CPT | Mod: ZP | Performed by: INTERNAL MEDICINE

## 2018-05-15 PROCEDURE — 93010 ELECTROCARDIOGRAM REPORT: CPT | Mod: ZP | Performed by: INTERNAL MEDICINE

## 2018-05-15 PROCEDURE — G0463 HOSPITAL OUTPT CLINIC VISIT: HCPCS | Mod: 25,ZF

## 2018-05-15 PROCEDURE — 93005 ELECTROCARDIOGRAM TRACING: CPT | Mod: ZF

## 2018-05-15 ASSESSMENT — PAIN SCALES - GENERAL: PAINLEVEL: NO PAIN (0)

## 2018-05-15 NOTE — NURSING NOTE
Chief Complaint   Patient presents with     Follow Up For     82 yo female present for ED follow up with SVT     Vitals were taken and medications were reconciled. EKG was performed    Roro SAWYER  2:19 PM

## 2018-05-15 NOTE — MR AVS SNAPSHOT
"              After Visit Summary   5/15/2018    Guido Larkin    MRN: [id number]           Patient Information     Date Of Birth          1935        Visit Information        Provider Department      5/15/2018 2:30 PM Penelope Simmons MD [facility]        Today's Diagnoses     SVT (supraventricular tachycardia) (H)    -  1      Care Instructions    You were seen today in the Cardiovascular Clinic at the [facility].     Cardiology Providers you saw during your visit: Dr. Penelope Simmons    Diagnosis:  Supraventricular tachycardia    Results: discussed with patient    Orders:   None    Medication Changes:   Stop metoprolol    Recommendations:   None    Follow-up:   As needed         Please feel free to call me with any questions or concerns.       Praneeth Raymundo LPN     Questions and [phone].   First press #1 for the [facility] and then press #3 for \"Medical Questions\" to reach us Cardiology Nurses.      On Call Cardiologist for after hours or on weekends: [phone]   option #4 and ask to speak to the on-call Cardiologist.          If you need a medication refill please contact your pharmacy.  Please allow 3 business days for your refill to be completed.            Follow-ups after your visit        Who to contact     If you have questions or need follow up information about today's clinic visit or your schedule please contact [facility] directly at [phone].  Normal or non-critical lab and imaging results will be communicated to you by MyChart, letter or phone within 4 business days after the clinic has received the results. If you do not hear from us within 7 days, please contact the clinic through MyChart or phone. If you have a critical or abnormal lab result, we will notify you by phone as soon as possible.  Submit refill requests through [employer] or call your pharmacy and they will forward the refill request to us. Please allow 3 business days for " "your refill to be completed.          Additional Information About Your Visit        [employer] Information     [employer] lets you send messages to your doctor, view your test results, renew your prescriptions, schedule appointments and more. To sign up, go to [email or web address] . Click on \"Log in\" on the left side of the screen, which will take you to the Welcome page. Then click on \"Sign up Now\" on the right side of the page.     You will be asked to enter the access code listed below, as well as some personal information. Please follow the directions to create your username and password.     Your access code is: [email or web address]  Expires: 2018 11:50 AM     Your access code will  in 90 days. If you need help or a new code, please call your [place] clinic or [phone].        Care EveryWhere ID     This is your Care EveryWhere ID. This could be used by other organizations to access your [place] medical records  [id number]        Your Vitals Were     Pulse Height Pulse Oximetry BMI (Body Mass Index)          60 1.676 m (5' 6\") 97% 22.97 kg/m2         Blood Pressure from Last 3 Encounters:   05/15/18 132/70   18 128/61   14 143/82    Weight from Last 3 Encounters:   05/15/18 64.5 kg (142 lb 4.8 oz)   18 65.2 kg (143 lb 11.2 oz)              We Performed the Following     EKG 12-lead, tracing only (Same Day)        Primary Care Provider Office Phone # Fax #    Sanjuanita Irving [phone]       [facility] [address]  [facility] [address]        Equal Access to Services     [facility] : Hadii solo Guardado, waelviada luqadaha, qaybta kaalmada adedella, alana calle. So [facility] [phone].    ATENCIÓN: Si habla español, tiene a gtz disposición servicios gratuitos de asistencia lingüística. Llame al [phone].    We comply with applicable federal civil rights laws and [place] laws. We do not discriminate on the basis " of race, color, national origin, age, disability, sex, sexual orientation, or gender identity.            Thank you!     Thank you for choosing [facility]  for your care. Our goal is always to provide you with excellent care. Hearing back from our patients is one way we can continue to improve our services. Please take a few minutes to complete the written survey that you may receive in the mail after your visit with us. Thank you!             Your Updated Medication List - Protect others around you: Learn how to safely use, store and throw away your medicines at www.disposemymeds.org.          This list is accurate as of 5/15/18  2:58 PM.  Always use your most recent med list.                   Brand Name Dispense Instructions for use Diagnosis    calcium carbonate 500 MG chewable tablet    TUMS     Take 2 chew tab by mouth daily (for calcium supplementation)        Carboxymethylcellulose Sod PF 0.5 % Soln ophthalmic solution    REFRESH PLUS     Place 1 drop into both eyes At Bedtime        * CENTRUM SILVER 50+WOMEN Tabs      Take 1 tablet by mouth daily        * multivitamin  with lutein Caps per capsule      Take 1 capsule by mouth 2 times daily (patient purchases [employer] brand eye vitamins)        EYLEA 2 MG/0.05ML Soln injection   Generic drug:  Aflibercept      2 mg by Intravitreal route every 28 days (Injection given in clinic - managed by [facility] opthalmology)        FISH OIL OMEGA-3 PO      Take 1 capsule by mouth daily        glucosamine-chondroitin 500-400 MG Caps per capsule      Take 1 capsule by mouth daily        ibuprofen 200 MG tablet    ADVIL/MOTRIN     Take 400 mg by mouth every 12 hours        levothyroxine 25 MCG tablet    SYNTHROID/LEVOTHROID     Take 25 mcg by mouth daily        metoprolol succinate 25 MG 24 hr tablet    TOPROL-XL    30 tablet    Take 1 tablet (25 mg) by mouth daily    SVT (supraventricular tachycardia) (H)       polyethylene glycol 0.4%- propylene glycol 0.3%  0.4-0.3 % Soln ophthalmic solution    SYSTANE ULTRA     Place 1 drop into both eyes every morning        pravastatin 80 MG tablet    PRAVACHOL     Take 80 mg by mouth At Bedtime        sertraline 50 MG tablet    ZOLOFT     Take 50 mg by mouth every morning        traZODone 50 MG tablet    DESYREL     Take 50 mg by mouth nightly as needed for sleep        Vitamin D (Cholecalciferol) 1000 units Tabs      Take 1 tablet by mouth daily        * Notice:  This list has 2 medication(s) that are the same as other medications prescribed for you. Read the directions carefully, and ask your doctor or other care provider to review them with you.

## 2018-05-15 NOTE — LETTER
5/15/2018       RE: Guido Larkin  [address]  [place] [address]       Dear Colleague,    Thank you for the opportunity to participate in the care of your patient, Guido Larkin, at the [facility] at [facility]. Please see a copy of my visit note below.    I am delighted to see Guido Larkin in consultation for supraventricular tachycardia.    History of Present Illness:  As you know, the patient is a 83 year old  female whom I met when she was admitted to the hospital on April 19th, 2018 with chest pressure mild lightheadedness.  She was diagnosed to have supraventricular tachycardia at 190 bpm which spontaneously terminated.  She was started on metoprolol XL 25 mg daily and discharged home the next day.  She has not had any recurrent symptoms since hospital discharge    She is accompanied today by her partner Nasreen.  Ms. Larkin has never had any episodes of palpitations in her life.  On April 19 she was running errands when she felt a chest fullness with the mild lightheadedness.  She continued with her errands but eventually drove home and told her partner about her symptoms and they drove to the emergency department here at [facility].  Her heart rate was documented to be at 190 bpm, blood pressure about 100 mmHg systolic.  An EKG showed narrow complex regular supraventricular tachycardia.  She took a deep breath and the tachycardia terminated without any specific medications given to her by the ED.  She was admitted to the hospital due to a troponin level of 0.7 which did not change upon repeat several hours later..  Her chest fullness resolved immediately upon termination of her tachycardia.    At baseline, she is very active.  She walks a minimum of 10,000 steps as documented by her Fitbit.  She does yard work and routine household activities without any symptoms.  She does report that she has been feeling fatigued since  she started metoprolol.    The following portions of the patient's history were reviewed and updated as appropriate: allergies, current medications, past family history, past medical history, past social history, past surgical history, and the problem list.    Past Medical History:  Supraventricular tachycardia 4/19/18.  Hypothyroidism  Macular degeneration  Cataracts  Ovarian cancer 1985    Medications:   Metoprolol XL 25 mg daily  Synthroid 25 mcg daily  Pravachol 80 mg daily  Trazodone as needed for sleep  Zoloft 50 mg daily  Calcium  Multivitamins  Vitamin D    Allergies:    Allergies   Allergen Reactions     Cats      Seasonal Allergies      Zinc Hives         Family History:   Family History   Problem Relation Age of Onset     Macular Degeneration Mother 75     CANCER Mother 70     breast cancer     DIABETES Mother      Dementia Mother 85     CEREBROVASCULAR DISEASE Maternal Grandmother      CANCER Maternal Grandmother      CANCER Maternal Grandfather      Hypertension No family hx of        Psychosocial history:  reports that she has quit smoking. She quit after 25.00 years of use. She has never used smokeless tobacco. She reports that she drinks about 1.2 oz of alcohol per week  She reports that she does not use illicit drugs.    Review of systems:   Cardiovascular: No palpitations, chest pain, shortness of breath at rest, dyspnea with exertion, orthopnea, paroxysmal nocturia dyspnea, nocturia, dizziness, syncope.    In addition,   Constitutional: No change in weight, sleep or appetite.  Normal energy.  No fever or chills  Eyes: Negative for vision changes or eye problems  ENT: No problems with ears, nose or throat.  No difficulty swallowing.  Resp: No coughing, wheezing or shortness of breath  GI: No nausea, vomiting,  heartburn, abdominal pain, diarrhea, constipation or change in bowel habits  : No urinary frequency or dysuria, bladder or kidney problems  Musculoskeletal: No significant muscle or joint  "pains  Neurologic: No headaches, numbness, tingling, weakness, problems with balance or coordination  Psychiatric: No problems with anxiety, depression or mental health  Heme/immune/allergy: No history of bleeding or clotting problems or anemia.  No allergies or immune system problems  Integumentary: No rashes,worrisome lesions or skin problems    Physical examination  Vitals: /70 (BP Location: Left arm)  Pulse 60  Ht 1.676 m (5' 6\")  Wt 64.5 kg (142 lb 4.8 oz)  SpO2 97%  BMI 22.97 kg/m2  BMI= Body mass index is 22.97 kg/(m^2).    Constitutional: In general, the patient is a pleasant female in no apparent distress.    Eyes: PERRLA.  EOMI.  Sclerae white, not injected.  ENT/mouth: Normiocephalic and atraumatic.  Nares clear.  Pharynx without erythema or exudate.  Dentition intact.  No adenopathy.  No thyromegaly. Carotids +2/2 bilaterally without bruits.  No jugular venous distension.   Card/Vasc: The PMI is in the 5th ICS in the midclavicular line. There is no heave. Regular rate and rhythm. Normal S1, S2. No murmur, rub, click, or gallop. Pulses are normal bilaterally throughout. No peripheral edema.  Respiratory: Clear to asculation.  No ronchi, wheezes, rales.  No dullness to percussion.   GI: Abdomen is soft, nontender, nondistended. No organomegaly. No AAA.  No bruits.   Integument: No significant bruises or rashes  Neurological: The neurological examination reveal a patient who was oriented to person, place, and time.    Psych: Normal  Heme/Lymph/Immun: no significant adenopathy    I have reviewed the following labs/imaging:  Labs 4/20/18: Potassium 4.0, creatinine 0.64, hemoglobin 14, TSH 3.3.  Troponins 0.769, then 0.742    I have personally and independently reviewed the following:  EKG 4/19/18: Narrow complex regular tachycardia at 190 bpm, with 1-2 mm ST depression laterally  EKG 4/19/18: Sinus rhythm with APCs, ST depression resolved.  EKG 5/15/18: Sinus rhythm at 58 bpm with normal " intervals      Assessment :  1.  Supraventricular tachycardia.  This appears to be the patient's only episode of SVT on 4/19/18.  It resolved spontaneously without any interventions.  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  I spent quite some time discussing the benefits of an EP study with curative ablation.  At this present time, since this was her first episode, she is not interested in any invasive procedures.  In fact, she would like to be off of metoprolol.  I think this is reasonable and I have asked her to keep an eye on her blood pressure to make sure that she is not developing hypertension.  If she has further episodes of SVT with durations of several hours, we can potentially consider giving her short acting beta blockers to shorten these episodes.  She knows that she is to call EMT or come to the emergency room if she has these episodes that would not terminate and/or if these episodes are associated with chest discomfort, shortness of breath, or syncope.  We have also spent some time going over maneuvers that she could do to try to terminate the tachycardia at home.    Plan:  1.  Discontinue metoprolol  2.  Return for follow-up as needed.      The patient is to return as needed. The patient understood the treatment plan as outlined above.  There were no barriers to learning.      Penelope Simmons MD

## 2018-05-15 NOTE — PROGRESS NOTES
I am delighted to see Davidradha Fanny Larkin in consultation for supraventricular tachycardia.    History of Present Illness:  As you know, the patient is a 83 year old  female whom I met when she was admitted to the hospital on April 19th, 2018 with chest pressure mild lightheadedness.  She was diagnosed to have supraventricular tachycardia at 190 bpm which spontaneously terminated.  She was started on metoprolol XL 25 mg daily and discharged home the next day.  She has not had any recurrent symptoms since hospital discharge    She is accompanied today by her partner Nasreen.  Ms. Larkin has never had any episodes of palpitations in her life.  On April 19 she was running errands when she felt a chest fullness with the mild lightheadedness.  She continued with her errands but eventually drove home and told her partner about her symptoms and they drove to the emergency department here at [facility].  Her heart rate was documented to be at 190 bpm, blood pressure about 100 mmHg systolic.  An EKG showed narrow complex regular supraventricular tachycardia.  She took a deep breath and the tachycardia terminated without any specific medications given to her by the ED.  She was admitted to the hospital due to a troponin level of 0.7 which did not change upon repeat several hours later..  Her chest fullness resolved immediately upon termination of her tachycardia.    At baseline, she is very active.  She walks a minimum of 10,000 steps as documented by her Fitbit.  She does yard work and routine household activities without any symptoms.  She does report that she has been feeling fatigued since she started metoprolol.    The following portions of the patient's history were reviewed and updated as appropriate: allergies, current medications, past family history, past medical history, past social history, past surgical history, and the problem list.    Past Medical History:  Supraventricular tachycardia  4/19/18.  Hypothyroidism  Macular degeneration  Cataracts  Ovarian cancer 1985    Medications:   Metoprolol XL 25 mg daily  Synthroid 25 mcg daily  Pravachol 80 mg daily  Trazodone as needed for sleep  Zoloft 50 mg daily  Calcium  Multivitamins  Vitamin D    Allergies:    Allergies   Allergen Reactions     Cats      Seasonal Allergies      Zinc Hives         Family History:   Family History   Problem Relation Age of Onset     Macular Degeneration Mother 75     CANCER Mother 70     breast cancer     DIABETES Mother      Dementia Mother 85     CEREBROVASCULAR DISEASE Maternal Grandmother      CANCER Maternal Grandmother      CANCER Maternal Grandfather      Hypertension No family hx of        Psychosocial history:  reports that she has quit smoking. She quit after 25.00 years of use. She has never used smokeless tobacco. She reports that she drinks about 1.2 oz of alcohol per week  She reports that she does not use illicit drugs.    Review of systems:   Cardiovascular: No palpitations, chest pain, shortness of breath at rest, dyspnea with exertion, orthopnea, paroxysmal nocturia dyspnea, nocturia, dizziness, syncope.    In addition,   Constitutional: No change in weight, sleep or appetite.  Normal energy.  No fever or chills  Eyes: Negative for vision changes or eye problems  ENT: No problems with ears, nose or throat.  No difficulty swallowing.  Resp: No coughing, wheezing or shortness of breath  GI: No nausea, vomiting,  heartburn, abdominal pain, diarrhea, constipation or change in bowel habits  : No urinary frequency or dysuria, bladder or kidney problems  Musculoskeletal: No significant muscle or joint pains  Neurologic: No headaches, numbness, tingling, weakness, problems with balance or coordination  Psychiatric: No problems with anxiety, depression or mental health  Heme/immune/allergy: No history of bleeding or clotting problems or anemia.  No allergies or immune system problems  Integumentary: No  "rashes,worrisome lesions or skin problems    Physical examination  Vitals: /70 (BP Location: Left arm)  Pulse 60  Ht 1.676 m (5' 6\")  Wt 64.5 kg (142 lb 4.8 oz)  SpO2 97%  BMI 22.97 kg/m2  BMI= Body mass index is 22.97 kg/(m^2).    Constitutional: In general, the patient is a pleasant female in no apparent distress.    Eyes: PERRLA.  EOMI.  Sclerae white, not injected.  ENT/mouth: Normiocephalic and atraumatic.  Nares clear.  Pharynx without erythema or exudate.  Dentition intact.  No adenopathy.  No thyromegaly. Carotids +2/2 bilaterally without bruits.  No jugular venous distension.   Card/Vasc: The PMI is in the 5th ICS in the midclavicular line. There is no heave. Regular rate and rhythm. Normal S1, S2. No murmur, rub, click, or gallop. Pulses are normal bilaterally throughout. No peripheral edema.  Respiratory: Clear to asculation.  No ronchi, wheezes, rales.  No dullness to percussion.   GI: Abdomen is soft, nontender, nondistended. No organomegaly. No AAA.  No bruits.   Integument: No significant bruises or rashes  Neurological: The neurological examination reveal a patient who was oriented to person, place, and time.    Psych: Normal  Heme/Lymph/Immun: no significant adenopathy    I have reviewed the following labs/imaging:  Labs 4/20/18: Potassium 4.0, creatinine 0.64, hemoglobin 14, TSH 3.3.  Troponins 0.769, then 0.742    I have personally and independently reviewed the following:  EKG 4/19/18: Narrow complex regular tachycardia at 190 bpm, with 1-2 mm ST depression laterally  EKG 4/19/18: Sinus rhythm with APCs, ST depression resolved.  EKG 5/15/18: Sinus rhythm at 58 bpm with normal intervals      Assessment :  1.  Supraventricular tachycardia.  This appears to be the patient's only episode of SVT on 4/19/18.  It resolved spontaneously without any interventions.  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  I spent quite some time discussing the " benefits of an EP study with curative ablation.  At this present time, since this was her first episode, she is not interested in any invasive procedures.  In fact, she would like to be off of metoprolol.  I think this is reasonable and I have asked her to keep an eye on her blood pressure to make sure that she is not developing hypertension.  If she has further episodes of SVT with durations of several hours, we can potentially consider giving her short acting beta blockers to shorten these episodes.  She knows that she is to call EMT or come to the emergency room if she has these episodes that would not terminate and/or if these episodes are associated with chest discomfort, shortness of breath, or syncope.  We have also spent some time going over maneuvers that she could do to try to terminate the tachycardia at home.    Plan:  1.  Discontinue metoprolol  2.  Return for follow-up as needed.      The patient is to return as needed. The patient understood the treatment plan as outlined above.  There were no barriers to learning.      Penelope Simmons MD

## 2018-05-15 NOTE — PATIENT INSTRUCTIONS
"You were seen today in the Cardiovascular Clinic at the [facility].     Cardiology Providers you saw during your visit: Dr. Penelope Simmons    Diagnosis:  Supraventricular tachycardia    Results: discussed with patient    Orders:   None    Medication Changes:   Stop metoprolol    Recommendations:   None    Follow-up:   As needed         Please feel free to call me with any questions or concerns.       Praneeth Raymundo LPN     Questions and [phone].   First press #1 for the [employer] and then press #3 for \"Medical Questions\" to reach us Cardiology Nurses.      On Call Cardiologist for after hours or on weekends: [phone]   option #4 and ask to speak to the on-call Cardiologist.          If you need a medication refill please contact your pharmacy.  Please allow 3 business days for your refill to be completed.    "

## 2018-05-16 LAB — INTERPRETATION ECG - MUSE: NORMAL

## 2018-06-20 ENCOUNTER — APPOINTMENT (OUTPATIENT)
Dept: GENERAL RADIOLOGY | Facility: CLINIC | Age: 83
End: 2018-06-20
Attending: EMERGENCY MEDICINE
Payer: MEDICARE

## 2018-06-20 ENCOUNTER — HOSPITAL ENCOUNTER (EMERGENCY)
Facility: CLINIC | Age: 83
Discharge: HOME | End: 2018-06-20
Attending: EMERGENCY MEDICINE | Admitting: EMERGENCY MEDICINE
Payer: MEDICARE

## 2018-06-20 ENCOUNTER — TRANSFERRED RECORDS (OUTPATIENT)
Dept: HEALTH INFORMATION MANAGEMENT | Facility: CLINIC | Age: 83
End: 2018-06-20

## 2018-06-20 VITALS
DIASTOLIC BLOOD PRESSURE: 63 MMHG | RESPIRATION RATE: 17 BRPM | WEIGHT: 145 LBS | BODY MASS INDEX: 23.4 KG/M2 | OXYGEN SATURATION: 97 % | TEMPERATURE: 97.9 F | SYSTOLIC BLOOD PRESSURE: 111 MMHG

## 2018-06-20 DIAGNOSIS — I47.10 PAROXYSMAL SUPRAVENTRICULAR TACHYCARDIA (H): ICD-10-CM

## 2018-06-20 DIAGNOSIS — I47.10 SVT (SUPRAVENTRICULAR TACHYCARDIA) (H): ICD-10-CM

## 2018-06-20 LAB
ALBUMIN SERPL-MCNC: 3.6 G/DL (ref 3.4–5)
ALP SERPL-CCNC: 56 U/L (ref 40–150)
ALT SERPL W P-5'-P-CCNC: 27 U/L (ref 0–50)
ANION GAP SERPL CALCULATED.3IONS-SCNC: 9 MMOL/L (ref 3–14)
AST SERPL W P-5'-P-CCNC: 30 U/L (ref 0–45)
BASOPHILS # BLD AUTO: 0 10E9/L (ref 0–0.2)
BASOPHILS NFR BLD AUTO: 0.3 %
BILIRUB SERPL-MCNC: 0.4 MG/DL (ref 0.2–1.3)
BUN SERPL-MCNC: 30 MG/DL (ref 7–30)
CALCIUM SERPL-MCNC: 8.2 MG/DL (ref 8.5–10.1)
CHLORIDE SERPL-SCNC: 108 MMOL/L (ref 94–109)
CO2 SERPL-SCNC: 26 MMOL/L (ref 20–32)
CREAT SERPL-MCNC: 0.79 MG/DL (ref 0.52–1.04)
DIFFERENTIAL METHOD BLD: NORMAL
EOSINOPHIL # BLD AUTO: 0.2 10E9/L (ref 0–0.7)
EOSINOPHIL NFR BLD AUTO: 2.9 %
ERYTHROCYTE [DISTWIDTH] IN BLOOD BY AUTOMATED COUNT: 13.1 % (ref 10–15)
GFR SERPL CREATININE-BSD FRML MDRD: 69 ML/MIN/1.7M2
GLUCOSE SERPL-MCNC: 132 MG/DL (ref 70–99)
HCT VFR BLD AUTO: 38.8 % (ref 35–47)
HGB BLD-MCNC: 12.8 G/DL (ref 11.7–15.7)
IMM GRANULOCYTES # BLD: 0 10E9/L (ref 0–0.4)
IMM GRANULOCYTES NFR BLD: 0.2 %
LYMPHOCYTES # BLD AUTO: 1.9 10E9/L (ref 0.8–5.3)
LYMPHOCYTES NFR BLD AUTO: 28.9 %
MCH RBC QN AUTO: 30.1 PG (ref 26.5–33)
MCHC RBC AUTO-ENTMCNC: 33 G/DL (ref 31.5–36.5)
MCV RBC AUTO: 91 FL (ref 78–100)
MONOCYTES # BLD AUTO: 0.5 10E9/L (ref 0–1.3)
MONOCYTES NFR BLD AUTO: 7.5 %
NEUTROPHILS # BLD AUTO: 3.9 10E9/L (ref 1.6–8.3)
NEUTROPHILS NFR BLD AUTO: 60.2 %
NRBC # BLD AUTO: 0 10*3/UL
NRBC BLD AUTO-RTO: 0 /100
PLATELET # BLD AUTO: 224 10E9/L (ref 150–450)
POTASSIUM SERPL-SCNC: 4.3 MMOL/L (ref 3.4–5.3)
PROT SERPL-MCNC: 6.6 G/DL (ref 6.8–8.8)
RBC # BLD AUTO: 4.25 10E12/L (ref 3.8–5.2)
SODIUM SERPL-SCNC: 142 MMOL/L (ref 133–144)
TROPONIN I SERPL-MCNC: <0.015 UG/L (ref 0–0.04)
WBC # BLD AUTO: 6.5 10E9/L (ref 4–11)

## 2018-06-20 PROCEDURE — 85025 COMPLETE CBC W/AUTO DIFF WBC: CPT | Performed by: EMERGENCY MEDICINE

## 2018-06-20 PROCEDURE — 25000132 ZZH RX MED GY IP 250 OP 250 PS 637: Mod: GY | Performed by: EMERGENCY MEDICINE

## 2018-06-20 PROCEDURE — 71045 X-RAY EXAM CHEST 1 VIEW: CPT

## 2018-06-20 PROCEDURE — 99285 EMERGENCY DEPT VISIT HI MDM: CPT | Performed by: EMERGENCY MEDICINE

## 2018-06-20 PROCEDURE — 99285 EMERGENCY DEPT VISIT HI MDM: CPT | Mod: Z6 | Performed by: EMERGENCY MEDICINE

## 2018-06-20 PROCEDURE — 93005 ELECTROCARDIOGRAM TRACING: CPT | Performed by: EMERGENCY MEDICINE

## 2018-06-20 PROCEDURE — 80053 COMPREHEN METABOLIC PANEL: CPT | Performed by: EMERGENCY MEDICINE

## 2018-06-20 PROCEDURE — A9270 NON-COVERED ITEM OR SERVICE: HCPCS | Mod: GY | Performed by: EMERGENCY MEDICINE

## 2018-06-20 PROCEDURE — 84484 ASSAY OF TROPONIN QUANT: CPT | Performed by: EMERGENCY MEDICINE

## 2018-06-20 RX ORDER — METOPROLOL SUCCINATE 25 MG/1
25 TABLET, EXTENDED RELEASE ORAL DAILY
Qty: 30 TABLET | Refills: 0 | Status: SHIPPED | OUTPATIENT
Start: 2018-06-20 | End: 2018-07-02

## 2018-06-20 RX ORDER — METOPROLOL SUCCINATE 25 MG/1
25 TABLET, EXTENDED RELEASE ORAL ONCE
Status: COMPLETED | OUTPATIENT
Start: 2018-06-20 | End: 2018-06-20

## 2018-06-20 RX ADMIN — METOPROLOL SUCCINATE 25 MG: 25 TABLET, EXTENDED RELEASE ORAL at 17:00

## 2018-06-20 ASSESSMENT — ENCOUNTER SYMPTOMS
SHORTNESS OF BREATH: 1
CHEST TIGHTNESS: 1
VOMITING: 0
NAUSEA: 0
ANAL BLEEDING: 0

## 2018-06-20 NOTE — DISCHARGE INSTRUCTIONS
Please make an appointment to follow up with Your Primary Care Provider and/or the Cardiology Clinic (phone: [phone]) in one week for recheck and to discuss medication alternatives as desired.    Return to the ER for worsening

## 2018-06-20 NOTE — ED AVS SNAPSHOT
[facility] [place], Emergency Department    [address] [place] [address]    Phone:  [phone]                                       Guido Larkin   MRN: [id number]    Department:  [facility], Emergency Department   Date of Visit:  6/20/2018           After Visit Summary Signature Page     I have received my discharge instructions, and my questions have been answered. I have discussed any challenges I see with this plan with the nurse or doctor.    ..........................................................................................................................................  Patient/Patient Representative Signature      ..........................................................................................................................................  Patient Representative Print Name and Relationship to Patient    ..................................................               ................................................  Date                                            Time    ..........................................................................................................................................  Reviewed by Signature/Title    ...................................................              ..............................................  Date                                                            Time

## 2018-06-20 NOTE — ED AVS SNAPSHOT
[facility], Emergency Department    [address] [address]    Phone:  [phone]                                       Guido Larkin   MRN: [id number]    Department:  [facility], Emergency Department   Date of Visit:  6/20/2018           Patient Information     Date Of Birth          1935        Your diagnoses for this visit were:     Paroxysmal supraventricular tachycardia (H)     SVT (supraventricular tachycardia) (H)        You were seen by Nacho Rowland MD.        Discharge Instructions       Please make an appointment to follow up with Your Primary Care Provider and/or the Cardiology Clinic (phone: [phone]) in one week for recheck and to discuss medication alternatives as desired.    Return to the ER for worsening    Discharge References/Attachments     SUPRAVENTRICULAR TACHYCARDIA (SVT), TREATMENT FOR  (ENGLISH)      24 Hour Appointment Hotline       To make an appointment at any [facility], call [phone] ([phone]). If you don't have a family doctor or clinic, we will help you find one. [place] clinics are conveniently located to serve the needs of you and your family.             Review of your medicines      Our records show that you are taking the medicines listed below. If these are incorrect, please call your family doctor or clinic.        Dose / Directions Last dose taken    calcium carbonate 500 MG chewable tablet   Commonly known as:  TUMS   Dose:  2 chew tab        Take 2 chew tab by mouth daily (for calcium supplementation)   Refills:  0        Carboxymethylcellulose Sod PF 0.5 % Soln ophthalmic solution   Commonly known as:  REFRESH PLUS   Dose:  1 drop        Place 1 drop into both eyes At Bedtime   Refills:  0        * CENTRUM SILVER 50+WOMEN Tabs   Dose:  1 tablet        Take 1 tablet by mouth daily   Refills:  0        * multivitamin  with lutein Caps per capsule   Dose:  1 capsule        Take 1 capsule by mouth 2 times  daily (patient purchases TEBS brand eye vitamins)   Refills:  0        EYLEA 2 MG/0.05ML Soln injection   Dose:  2 mg   Generic drug:  Aflibercept        2 mg by Intravitreal route every 28 days (Injection given in clinic - managed by [facility] opthalmology)   Refills:  0        FISH OIL OMEGA-3 PO   Dose:  1 capsule        Take 1 capsule by mouth daily   Refills:  0        glucosamine-chondroitin 500-400 MG Caps per capsule   Dose:  1 capsule        Take 1 capsule by mouth daily   Refills:  0        ibuprofen 200 MG tablet   Commonly known as:  ADVIL/MOTRIN   Dose:  400 mg        Take 400 mg by mouth every 12 hours   Refills:  0        levothyroxine 25 MCG tablet   Commonly known as:  SYNTHROID/LEVOTHROID   Dose:  25 mcg        Take 25 mcg by mouth daily   Refills:  0        metoprolol succinate 25 MG 24 hr tablet   Commonly known as:  TOPROL-XL   Dose:  25 mg   Quantity:  30 tablet        Take 1 tablet (25 mg) by mouth daily   Refills:  0        polyethylene glycol 0.4%- propylene glycol 0.3% 0.4-0.3 % Soln ophthalmic solution   Commonly known as:  SYSTANE ULTRA   Dose:  1 drop        Place 1 drop into both eyes every morning   Refills:  0        pravastatin 80 MG tablet   Commonly known as:  PRAVACHOL   Dose:  80 mg        Take 80 mg by mouth At Bedtime   Refills:  0        sertraline 50 MG tablet   Commonly known as:  ZOLOFT   Dose:  50 mg        Take 50 mg by mouth every morning   Refills:  0        traZODone 50 MG tablet   Commonly known as:  DESYREL   Dose:  50 mg        Take 50 mg by mouth nightly as needed for sleep   Refills:  0        Vitamin D (Cholecalciferol) 1000 units Tabs   Dose:  1 tablet        Take 1 tablet by mouth daily   Refills:  0        * Notice:  This list has 2 medication(s) that are the same as other medications prescribed for you. Read the directions carefully, and ask your doctor or other care provider to review them with you.            Prescriptions were sent or printed at these  "locations (1 Prescription)                   Other Prescriptions                Printed at Department/Unit printer (1 of 1)         metoprolol succinate (TOPROL-XL) 25 MG 24 hr tablet                Procedures and tests performed during your visit     CBC with platelets differential    Chest  XR, 1 view portable    Comprehensive metabolic panel    EKG 12-lead, tracing only    Troponin I      Orders Needing Specimen Collection     None      Pending Results     Date and Time Order Name Status Description    6/20/2018 1455 EKG 12-lead, tracing only Preliminary             Pending Culture Results     No orders found from 6/18/2018 to 6/21/2018.            Pending Results Instructions     If you had any lab results that were not finalized at the time of your Discharge, you can call the ED Lab Result RN at [phone]. You will be contacted by this team for any positive Lab results or changes in treatment. The nurses are available 7 days a week from 10A to 6:30P.  You can leave a message 24 hours per day and they will return your call.        Thank you for choosing [facility]       Thank you for choosing [facility] for your care. Our goal is always to provide you with excellent care. Hearing back from our patients is one way we can continue to improve our services. Please take a few minutes to complete the written survey that you may receive in the mail after you visit with us. Thank you!        Project [email or web address] Information     [employer] lets you send messages to your doctor, view your test results, renew your prescriptions, schedule appointments and more. To sign up, go to [email or web address] . Click on \"Log in\" on the left side of the screen, which will take you to the Welcome page. Then click on \"Sign up Now\" on the right side of the page.     You will be asked to enter the access code listed below, as well as some personal information. Please follow the directions to create your username and password.     Your access code is: " [email or web address]  Expires: 2018 11:50 AM     Your access code will  in 90 days. If you need help or a new code, please call your [place] clinic or [phone].        Care EveryWhere ID     This is your Care EveryWhere ID. This could be used by other organizations to access your [place] medical records  [id number]        Equal Access to Services     [facility]: Hadii solo fuchso Sodayana, waaxda luqadaha, qaybta kaalmada adedella, alana maddox . So [facility] [phone].    ATENCIÓN: Si habla español, tiene a gtz disposición servicios gratuitos de asistencia lingüística. Llame al [phone].    We comply with applicable federal civil rights laws and [place] laws. We do not discriminate on the basis of race, color, national origin, age, disability, sex, sexual orientation, or gender identity.            After Visit Summary       This is your record. Keep this with you and show to your community pharmacist(s) and doctor(s) at your next visit.

## 2018-06-20 NOTE — ED TRIAGE NOTES
Pt BIBA due to SVT. Pt reports chest pressure, SOB with episode. EMS gave 12 mg of adenosine in route. Pt converted to NSR. Pt in NSR upond arrival to ED. Denies any other symptoms. Vitally stable in triage.

## 2018-06-20 NOTE — ED PROVIDER NOTES
History     Chief Complaint   Patient presents with     Shortness of Breath     HPI  Guido Larkin is a 83 year old female with a previous history of SVT for which she had been placed on beta blockers. The patient states that she stopped her beta blocker a month ago and states that today she developed another episode of SVT for her second episode total. Patient states she felt her heart racing, could feel her chest tightening and is short of breath and states that she some near syncopal symptom as well. Patient states that she went to an urgent care and tried some valsalva maneuvers and even splashed cold water on her face but could not get herself out of the rhythm. 911 was called and paramedics arrived on the scene and then gave the patient 12 mg of adenocard which medically cardioverted her into a normal sinus rhythm. Patient arrived in the ER asymptomatic and states that she feels pretty much back to her normal state of health. Patient states that the whole episode lasted approximately an hour and a half. She denies any recent illness, vomiting, diarrhea, melena or bright blood per rectum.     This part of the medical record was transcribed by Paulette Dorsey, Medical Scribe, from a dictation done by Dr. Rowland.    I have reviewed the Medications, Allergies, Past Medical and Surgical History, and Social History in the Epic system.      Past Medical History:   Diagnosis Date     Macular degeneration     WET RE / DRY LE / injections q 4-6 weeks     Nonsenile cataract      Ovarian cancer (H) 1985     Pterygium of both eyes        Past Surgical History:   Procedure Laterality Date     GYN SURGERY  1984/85    abdominal/ovarian        Family History   Problem Relation Age of Onset     Macular Degeneration Mother 75     Cancer Mother 70     breast cancer     Diabetes Mother      Dementia Mother 85     Cerebrovascular Disease Maternal Grandmother      Cancer Maternal Grandmother      Cancer Maternal  Grandfather      Hypertension No family hx of        Social History   Substance Use Topics     Smoking status: Former Smoker     Years: 25.00     Smokeless tobacco: Never Used     Alcohol use 1.2 oz/week     2 Shots of liquor per week      Comment: 2 scotch daily       Previous Medications    AFLIBERCEPT (EYLEA) 2 MG/0.05ML SOLN INJECTION    2 mg by Intravitreal route every 28 days (Injection given in clinic - [facility])    CALCIUM CARBONATE (TUMS) 500 MG CHEWABLE TABLET    Take 2 chew tab by mouth daily (for calcium supplementation)    CARBOXYMETHYLCELLULOSE SOD PF (REFRESH PLUS) 0.5 % SOLN OPHTHALMIC SOLUTION    Place 1 drop into both eyes At Bedtime    GLUCOSAMINE-CHONDROITIN 500-400 MG CAPS PER CAPSULE    Take 1 capsule by mouth daily    IBUPROFEN (ADVIL/MOTRIN) 200 MG TABLET    Take 400 mg by mouth every 12 hours    LEVOTHYROXINE (SYNTHROID/LEVOTHROID) 25 MCG TABLET    Take 25 mcg by mouth daily    MULTIPLE VITAMINS-MINERALS (CENTRUM SILVER 50+WOMEN) TABS    Take 1 tablet by mouth daily    MULTIVITAMIN  WITH LUTEIN (OCUVITE WITH LTEIN) CAPS PER CAPSULE    Take 1 capsule by mouth 2 times daily (patient purchases [employer]-trans[employer] brand eye vitamins)     OMEGA-3 FATTY ACIDS (FISH OIL OMEGA-3 PO)    Take 1 capsule by mouth daily    POLYETHYLENE GLYCOL 0.4%- PROPYLENE GLYCOL 0.3% (SYSTANE ULTRA) 0.4-0.3 % SOLN OPHTHALMIC SOLUTION    Place 1 drop into both eyes every morning    PRAVASTATIN (PRAVACHOL) 80 MG TABLET    Take 80 mg by mouth At Bedtime    SERTRALINE (ZOLOFT) 50 MG TABLET    Take 50 mg by mouth every morning    TRAZODONE (DESYREL) 50 MG TABLET    Take 50 mg by mouth nightly as needed for sleep    VITAMIN D, CHOLECALCIFEROL, 1000 UNITS TABS    Take 1 tablet by mouth daily        Allergies   Allergen Reactions     Cats      Seasonal Allergies      Zinc Hives       Review of Systems   Respiratory: Positive for chest tightness and shortness of breath.    Gastrointestinal: Negative for anal  bleeding, nausea and vomiting.   All other systems reviewed and are negative.      Physical Exam   BP: 134/71  Heart Rate: 75  Temp: 97.9  F (36.6  C)  Resp: 19  Weight: 65.8 kg (145 lb)  SpO2: 96 %      Physical Exam   Constitutional: She is oriented to person, place, and time.   Alert conversant pleasant   HENT:   Head: Atraumatic.   Eyes: EOM are normal. Pupils are equal, round, and reactive to light.   Neck: Neck supple.   Cardiovascular: Normal heart sounds.    Pulmonary/Chest: Breath sounds normal.   Abdominal: Soft. There is no tenderness.   Musculoskeletal: She exhibits no edema or deformity.   Neurological: She is alert and oriented to person, place, and time. No cranial nerve deficit.   Grossly intact and symmetric   Skin: Skin is warm.   Psychiatric: She has a normal mood and affect.       ED Course     ED Course     Procedures          Patient was placed on cardiac monitor and oximetry.    IV had been established by paramedics and at this time the patient was in a normal sinus rhythm.    EKG revealed a normal sinus rhythm at a rate of 90 with a UT interval of 0.118 QRS duration of 0.080.  The patient had a normal axis with no acute ST or T-wave changes significant for ischemia.    Patient remained asymptomatic while in the ER.    Results for orders placed or performed during the hospital encounter of 06/20/18   Chest  XR, 1 view portable    Narrative    EXAM: XR CHEST PORT 1 VW  6/20/2018 3:08 PM     HISTORY:  SOB;        COMPARISON: Chest radiograph 4/19/2018    FINDINGS: Single portable AP view of chest. Cardiac size is within  normal limits. No pneumothorax or pleural effusion. No focal airspace  opacity.      Impression    IMPRESSION: No acute cardiopulmonary findings. Clear chest.   CBC with platelets differential   Result Value Ref Range    WBC 6.5 4.0 - 11.0 10e9/L    RBC Count 4.25 3.8 - 5.2 10e12/L    Hemoglobin 12.8 11.7 - 15.7 g/dL    Hematocrit 38.8 35.0 - 47.0 %    MCV 91 78 - 100 fl    MCH  30.1 26.5 - 33.0 pg    MCHC 33.0 31.5 - 36.5 g/dL    RDW 13.1 10.0 - 15.0 %    Platelet Count 224 150 - 450 10e9/L    Diff Method Automated Method     % Neutrophils 60.2 %    % Lymphocytes 28.9 %    % Monocytes 7.5 %    % Eosinophils 2.9 %    % Basophils 0.3 %    % Immature Granulocytes 0.2 %    Nucleated RBCs 0 0 /100    Absolute Neutrophil 3.9 1.6 - 8.3 10e9/L    Absolute Lymphocytes 1.9 0.8 - 5.3 10e9/L    Absolute Monocytes 0.5 0.0 - 1.3 10e9/L    Absolute Eosinophils 0.2 0.0 - 0.7 10e9/L    Absolute Basophils 0.0 0.0 - 0.2 10e9/L    Abs Immature Granulocytes 0.0 0 - 0.4 10e9/L    Absolute Nucleated RBC 0.0    Comprehensive metabolic panel   Result Value Ref Range    Sodium 142 133 - 144 mmol/L    Potassium 4.3 3.4 - 5.3 mmol/L    Chloride 108 94 - 109 mmol/L    Carbon Dioxide 26 20 - 32 mmol/L    Anion Gap 9 3 - 14 mmol/L    Glucose 132 (H) 70 - 99 mg/dL    Urea Nitrogen 30 7 - 30 mg/dL    Creatinine 0.79 0.52 - 1.04 mg/dL    GFR Estimate 69 >60 mL/min/1.7m2    GFR Estimate If Black 84 >60 mL/min/1.7m2    Calcium 8.2 (L) 8.5 - 10.1 mg/dL    Bilirubin Total 0.4 0.2 - 1.3 mg/dL    Albumin 3.6 3.4 - 5.0 g/dL    Protein Total 6.6 (L) 6.8 - 8.8 g/dL    Alkaline Phosphatase 56 40 - 150 U/L    ALT 27 0 - 50 U/L    AST 30 0 - 45 U/L   Troponin I   Result Value Ref Range    Troponin I ES <0.015 0.000 - 0.045 ug/L   EKG 12-lead, tracing only   Result Value Ref Range    Interpretation ECG Click View Image link to view waveform and result        Labs Ordered and Resulted from Time of ED Arrival Up to the Time of Departure from the ED   COMPREHENSIVE METABOLIC PANEL - Abnormal; Notable for the following:        Result Value    Glucose 132 (*)     Calcium 8.2 (*)     Protein Total 6.6 (*)     All other components within normal limits   CBC WITH PLATELETS DIFFERENTIAL   TROPONIN I         Assessments & Plan (with Medical Decision Making)     I have reviewed the nursing notes.    Case was discussed with the patient's  cardiologist and at this time the Toprol will be restarted.    Medications   metoprolol succinate (TOPROL-XL) 24 hr tablet 25 mg (not administered)   pending admin    I have reviewed the findings, diagnosis, plan and need for follow up with the patient.  Patient comfortable with outpatient management.    Modified Medications    Modified Medication Previous Medication    METOPROLOL SUCCINATE (TOPROL-XL) 25 MG 24 HR TABLET metoprolol succinate (TOPROL-XL) 25 MG 24 hr tablet       Take 1 tablet (25 mg) by mouth daily    Take 1 tablet (25 mg) by mouth daily         Final diagnoses:   Paroxysmal supraventricular tachycardia (H)     Please make an appointment to follow up with Your Primary Care Provider and/or the Cardiology Clinic (phone: [phone]) in one week for recheck and to discuss medication alternatives as desired.    Return to the ER for worsening    Nacho Rowland MD    6/20/2018   [facility] EMERGENCY DEPARTMENT     Nacho Rowland MD  06/20/18 [id number]

## 2018-06-21 LAB — INTERPRETATION ECG - MUSE: NORMAL

## 2018-06-22 ENCOUNTER — CARE COORDINATION (OUTPATIENT)
Dept: CARDIOLOGY | Facility: CLINIC | Age: 83
End: 2018-06-22

## 2018-06-22 NOTE — PROGRESS NOTES
LM for patient to return call. Called patient to see if she had restarted her metoprolol per request by Dr. Simmons. See ED notes from 06/20/2018 for more details.

## 2018-07-02 ENCOUNTER — OFFICE VISIT (OUTPATIENT)
Dept: CARDIOLOGY | Facility: CLINIC | Age: 83
End: 2018-07-02
Attending: INTERNAL MEDICINE
Payer: MEDICARE

## 2018-07-02 VITALS
HEIGHT: 66 IN | DIASTOLIC BLOOD PRESSURE: 56 MMHG | SYSTOLIC BLOOD PRESSURE: 123 MMHG | HEART RATE: 51 BPM | BODY MASS INDEX: 23.14 KG/M2 | WEIGHT: 144 LBS | OXYGEN SATURATION: 98 %

## 2018-07-02 DIAGNOSIS — I47.10 SVT (SUPRAVENTRICULAR TACHYCARDIA) (H): ICD-10-CM

## 2018-07-02 PROCEDURE — 99213 OFFICE O/P EST LOW 20 MIN: CPT | Mod: ZP | Performed by: INTERNAL MEDICINE

## 2018-07-02 PROCEDURE — G0463 HOSPITAL OUTPT CLINIC VISIT: HCPCS | Mod: ZF

## 2018-07-02 RX ORDER — METOPROLOL SUCCINATE 25 MG/1
25 TABLET, EXTENDED RELEASE ORAL DAILY
Qty: 90 TABLET | Refills: 3 | Status: SHIPPED | OUTPATIENT
Start: 2018-07-02 | End: 2019-04-21

## 2018-07-02 RX ORDER — PROPRANOLOL HYDROCHLORIDE 20 MG/1
TABLET ORAL
Qty: 10 TABLET | Refills: 0 | Status: SHIPPED | OUTPATIENT
Start: 2018-07-02

## 2018-07-02 ASSESSMENT — PAIN SCALES - GENERAL: PAINLEVEL: NO PAIN (0)

## 2018-07-02 NOTE — MR AVS SNAPSHOT
"              After Visit Summary   2018    Guido Larkin    MRN: [id number]           Patient Information     Date Of Birth          1935        Visit Information        Provider Department      2018 12:00 PM Penelope Simmons MD [facility]        Today's Diagnoses     SVT (supraventricular tachycardia) (H)          Care Instructions    You were seen today in the Cardiovascular Clinic at the [facility].     Cardiology Providers you saw during your visit: Dr. Penelope Simmons    Diagnosis:  Supraventricular tachycardia    Results: discussed with patient    Orders:   None    Medication Changes:   Continue metoprolol succinate 25 mg once a day  Propranol 20 mg 1 pill as needed with fast heart rate      Recommendations:   None    Follow-up:   As needed         Please feel free to call me with any questions or concerns.       Praneeth Raymundo LPN     Questions and [phone].   First press #1 for the ITN and then press #3 for \"Medical Questions\" to reach us Cardiology Nurses.      On Call Cardiologist for after hours or on weekends: [phone]   option #4 and ask to speak to the on-call Cardiologist.          If you need a medication refill please contact your pharmacy.  Please allow 3 business days for your refill to be completed.            Follow-ups after your visit        Who to contact     If you have questions or need follow up information about today's clinic visit or your schedule please contact [facility] directly at [phone].  Normal or non-critical lab and imaging results will be communicated to you by MyChart, letter or phone within 4 business days after the clinic has received the results. If you do not hear from us within 7 days, please contact the clinic through MyChart or phone. If you have a critical or abnormal lab result, we will notify you by phone as soon as possible.  Submit refill requests through [employer] or call your pharmacy " "and they will forward the refill request to us. Please allow 3 business days for your refill to be completed.          Additional Information About Your Visit        Care EveryWhere ID     This is your Care EveryWhere ID. This could be used by other organizations to access your [place] medical records  [id number]        Your Vitals Were     Pulse Height Pulse Oximetry BMI (Body Mass Index)          51 1.676 m (5' 6\") 98% 23.24 kg/m2         Blood Pressure from Last 3 Encounters:   07/02/18 123/56   06/20/18 111/63   05/15/18 132/70    Weight from Last 3 Encounters:   07/02/18 65.3 kg (144 lb)   06/20/18 65.8 kg (145 lb)   05/15/18 64.5 kg (142 lb 4.8 oz)              Today, you had the following     No orders found for display         Today's Medication Changes          These changes are accurate as of 7/2/18 12:42 PM.  If you have any questions, ask your nurse or doctor.               Start taking these medicines.        Dose/Directions    propranolol 20 MG tablet   Commonly known as:  INDERAL   Used for:  SVT (supraventricular tachycardia) (H)   Started by:  Penelope Simmons MD        Take one pill as needed with fast heart rates   Quantity:  10 tablet   Refills:  0            Where to get your medicines      These medications were sent to [facility] [place], [place] - [address], [place] [address]     Phone:  [phone]     propranolol 20 MG tablet         These medications were sent to [facility] - [place], [place] - [address] [facility] [address]  [address] [facility] [address], [facility] [address]     Phone:  [phone]     metoprolol succinate 25 MG 24 hr tablet                Primary Care Provider Office Phone # Fax #    Sanjuanita Irving [phone]       [address] [address]        Equal Access to Services     DELORES CA AH: Hadii solo kumari hadasho Soomaali, waaxda luqadaha, qaybta kaalmada adeegyada, alana pakin " mora gutiérrezruslan carolynclayton lamariposalinn ah. So [facility] [phone].    ATENCIÓN: Si cherellela alyce, tiene a gtz disposición servicios gratuitos de asistencia lingüística. Violette al [phone].    We comply with applicable federal civil rights laws and [place] laws. We do not discriminate on the basis of race, color, national origin, age, disability, sex, sexual orientation, or gender identity.            Thank you!     Thank you for choosing [facility]  for your care. Our goal is always to provide you with excellent care. Hearing back from our patients is one way we can continue to improve our services. Please take a few minutes to complete the written survey that you may receive in the mail after your visit with us. Thank you!             Your Updated Medication List - Protect others around you: Learn how to safely use, store and throw away your medicines at www.disposemymeds.org.          This list is accurate as of 7/2/18 12:42 PM.  Always use your most recent med list.                   Brand Name Dispense Instructions for use Diagnosis    calcium carbonate 500 MG chewable tablet    TUMS     Take 2 chew tab by mouth daily (for calcium supplementation)        Carboxymethylcellulose Sod PF 0.5 % Soln ophthalmic solution    REFRESH PLUS     Place 1 drop into both eyes At Bedtime        * CENTRUM SILVER 50+WOMEN Tabs      Take 1 tablet by mouth daily        * multivitamin  with lutein Caps per capsule      Take 1 capsule by mouth 2 times daily (patient purchases [employer] brand eye vitamins)        EYLEA 2 MG/0.05ML Soln injection   Generic drug:  Aflibercept      2 mg by Intravitreal route every 28 days (Injection given in clinic - managed by [facility] opthalmology)        FISH OIL OMEGA-3 PO      Take 1 capsule by mouth daily        glucosamine-chondroitin 500-400 MG Caps per capsule      Take 1 capsule by mouth daily        ibuprofen 200 MG tablet    ADVIL/MOTRIN     Take 400 mg by mouth every 12 hours         levothyroxine 25 MCG tablet    SYNTHROID/LEVOTHROID     Take 25 mcg by mouth daily        metoprolol succinate 25 MG 24 hr tablet    TOPROL-XL    90 tablet    Take 1 tablet (25 mg) by mouth daily    SVT (supraventricular tachycardia) (H)       polyethylene glycol 0.4%- propylene glycol 0.3% 0.4-0.3 % Soln ophthalmic solution    SYSTANE ULTRA     Place 1 drop into both eyes every morning        pravastatin 80 MG tablet    PRAVACHOL     Take 80 mg by mouth At Bedtime        propranolol 20 MG tablet    INDERAL    10 tablet    Take one pill as needed with fast heart rates    SVT (supraventricular tachycardia) (H)       sertraline 50 MG tablet    ZOLOFT     Take 50 mg by mouth every morning        traZODone 50 MG tablet    DESYREL     Take 50 mg by mouth nightly as needed for sleep        Vitamin D (Cholecalciferol) 1000 units Tabs      Take 1 tablet by mouth daily        * Notice:  This list has 2 medication(s) that are the same as other medications prescribed for you. Read the directions carefully, and ask your doctor or other care provider to review them with you.

## 2018-07-02 NOTE — NURSING NOTE
Chief Complaint   Patient presents with     Follow Up For     ED follow up for SVT, Per pt     Vitals were performed, medications were reconciled.  Ebony Lorenzo MA

## 2018-07-02 NOTE — LETTER
7/2/2018      RE: Guido Larkin  [address]  [place] [address]       Dear Colleague,    Thank you for the opportunity to participate in the care of your patient, Guido Larkin, at the [facility] HEART [facility]. Please see a copy of my visit note below.    I am delighted to see Guido Larkin for follow up of supraventricular tachycardia.     As you know, the patient is a 83 year old  female whom I met when she was admitted to the hospital on April 19th, 2018 with chest pressure mild lightheadedness.  She was diagnosed to have supraventricular tachycardia at 190 bpm which spontaneously terminated.  She was started on metoprolol XL 25 mg daily and discharged home the next day. I saw for follow up on 5/15/18 at which time she had not had any recurrent symptoms, and she wanted to stop metoprolol due to increased fatigue. I cautioned her on recurrent symptoms, and had a long discussion with her about benefits of an EP study with potential curative ablation. She had wanted to wait on invasive procedures, and stopped metoprolol.       On 6/20/18 she had a recurrent episode of SVT, with symptoms of chest fullness, dizziness. She tried Valsalva maneuvers without success. Her partner Nasreen took her to the urgent care. HR up to 200 bpm with . EMT was called and took her to [facility] ED. Along the way he was given adenosine 12 mg which converted her to sinus. She was discharged from ED, resumed metoprolol. No recurrence since.    The following portions of the patient's history were reviewed and updated as appropriate: allergies, current medications, past family history, past medical history, past social history, past surgical history, and the problem list.    Past Medical History:  Supraventricular tachycardia 4/19/18, 6/20/18  Hypothyroidism  Macular degeneration  Cataracts  Ovarian cancer 1985    Allergies:    Allergies   Allergen  Reactions     Cats      Levonorgestrel-Ethinyl Estrad Cough and Other (See Comments)     Watery Eyes, Runny Nose     Seasonal Allergies      Zinc Hives     Medications:  Metoprolol XL 25 mg daily  Synthroid 25 mcg daily  Pravachol 80 mg daily  Trazodone as needed for sleep  Zoloft 50 mg daily  Calcium  Multivitamins  Vitamin D    Family History:   Family History   Problem Relation Age of Onset     Macular Degeneration Mother 75     Cancer Mother 70     breast cancer     Diabetes Mother      Dementia Mother 85     Cerebrovascular Disease Maternal Grandmother      Cancer Maternal Grandmother      Cancer Maternal Grandfather      Hypertension No family hx of      Psychosocial history:  reports that she has quit smoking. She quit after 25.00 years of use. She has never used smokeless tobacco. She reports that she drinks about 1.2 oz of alcohol per week  She reports that she does not use illicit drugs. 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     Review of systems: Cardiovascular - no chest pain, palpitations, dizziness, shortness of breath, dyspnea, orthopnea, PND.    In addition,   Constitutional: No change in weight, sleep or appetite.  Normal energy.  No fever or chills  Eyes: Negative for vision changes or eye problems  ENT: No problems with ears, nose or throat.  No difficulty swallowing.  Resp: No coughing, wheezing or shortness of breath  GI: No nausea, vomiting,  heartburn, abdominal pain, diarrhea, constipation or change in bowel habits  : No urinary frequency or dysuria, bladder or kidney problems  Musculoskeletal: No significant muscle or joint pains  Neurologic: No headaches, numbness, tingling, weakness, problems with balance or coordination  Psychiatric: No problems with anxiety, depression or mental health  Heme/immune/allergy: No history of bleeding or clotting problems or anemia.  No allergies or immune system problems  Integumentary: No rashes,worrisome lesions or skin problems    Physical  "examination  Vitals: /56 (BP Location: Left arm, Patient Position: Chair, Cuff Size: Adult Regular)  Pulse 51  Ht 1.676 m (5' 6\")  Wt 65.3 kg (144 lb)  SpO2 98%  BMI 23.24 kg/m2  BMI= Body mass index is 23.24 kg/(m^2).    Constitutional: In general, the patient is a pleasant female in no apparent distress.    Eyes: PERRLA.  EOMI.  Sclerae white, not injected.  ENT/mouth: Normiocephalic and atraumatic.  Nares clear.  Pharynx without erythema or exudate.  Dentition intact.  No adenopathy.  No thyromegaly. Carotids +2/2 bilaterally without bruits.  No jugular venous distension.   Card/Vasc: The PMI is in the 5th ICS in the midclavicular line. There is no heave. Regular rate and rhythm. Normal S1, S2. No murmur, rub, click, or gallop. Pulses are normal bilaterally throughout. No peripheral edema.  Respiratory: Clear to asculation.  No ronchi, wheezes, rales.  No dullness to percussion.   GI: Abdomen is soft, nontender, nondistended. No organomegaly. No AAA.  No bruits.   Integument: No significant bruises or rashes  Neurological: The neurological examination reveal a patient who was oriented to person, place, and time.    Psych: Normal  Heme/Lymph/Immun: no significant adenopathy    I have reviewed the following labs/imagin19: cr 0.69, K 4.3, hgb 12.8, plt 224K    I have personally and independently reviewed the following:  EMT strips from 18: SVT, terminated with adenosine.    Assessment :  Supraventricular tachycardia. Doing well, no recurrence since 18. Back on metoprolol without much fatigue. 90 day supply sent to mail order per patient's request. She is going to spend 6 months winter in [place] where they are 30 minutes away from any hospital. I gave her propranolol short-acting 20 mg tablets to take when she has an episode to see if it can shorten duration.    Plan:  1. Continue metoprolol succinate 20 mg qday  2. Propranolol 20 mg take as needed for palpitations  3. Offer of EPS with " ablation again discussed with patient. She will let me know if she wants to proceed.    The patient is to return as needed. She will notify me via MyChart if she has any recurrences. The patient understood the treatment plan as outlined above.  There were no barriers to learning.    Please do not hesitate to contact me if you have any questions/concerns.     Sincerely,     Penelope Simmons MD

## 2018-07-02 NOTE — PROGRESS NOTES
I am delighted to see Guido Larkin for follow up of supraventricular tachycardia.     As you know, the patient is a 83 year old  female whom I met when she was admitted to the hospital on April 19th, 2018 with chest pressure mild lightheadedness.  She was diagnosed to have supraventricular tachycardia at 190 bpm which spontaneously terminated.  She was started on metoprolol XL 25 mg daily and discharged home the next day. I saw for follow up on 5/15/18 at which time she had not had any recurrent symptoms, and she wanted to stop metoprolol due to increased fatigue. I cautioned her on recurrent symptoms, and had a long discussion with her about benefits of an EP study with potential curative ablation. She had wanted to wait on invasive procedures, and stopped metoprolol.       On 6/20/18 she had a recurrent episode of SVT, with symptoms of chest fullness, dizziness. She tried Valsalva maneuvers without success. Her partner Nasreen took her to the urgent care. HR up to 200 bpm with . EMT was called and took her to [facility] ED. Along the way he was given adenosine 12 mg which converted her to sinus. She was discharged from ED, resumed metoprolol. No recurrence since.    The following portions of the patient's history were reviewed and updated as appropriate: allergies, current medications, past family history, past medical history, past social history, past surgical history, and the problem list.      Past Medical History:  Supraventricular tachycardia 4/19/18, 6/20/18  Hypothyroidism  Macular degeneration  Cataracts  Ovarian cancer 1985    Allergies:    Allergies   Allergen Reactions     Cats      Levonorgestrel-Ethinyl Estrad Cough and Other (See Comments)     Watery Eyes, Runny Nose     Seasonal Allergies      Zinc Hives       Medications:  Metoprolol XL 25 mg daily  Synthroid 25 mcg daily  Pravachol 80 mg daily  Trazodone as needed for sleep  Zoloft 50 mg daily  Calcium  Multivitamins  Vitamin D    Family  "History:   Family History   Problem Relation Age of Onset     Macular Degeneration Mother 75     Cancer Mother 70     breast cancer     Diabetes Mother      Dementia Mother 85     Cerebrovascular Disease Maternal Grandmother      Cancer Maternal Grandmother      Cancer Maternal Grandfather      Hypertension No family hx of        Psychosocial history:  reports that she has quit smoking. She quit after 25.00 years of use. She has never used smokeless tobacco. She reports that she drinks about 1.2 oz of alcohol per week  She reports that she does not use illicit drugs. Retired college guidance counselor.     Review of systems: Cardiovascular - no chest pain, palpitations, dizziness, shortness of breath, dyspnea, orthopnea, PND.    In addition,   Constitutional: No change in weight, sleep or appetite.  Normal energy.  No fever or chills  Eyes: Negative for vision changes or eye problems  ENT: No problems with ears, nose or throat.  No difficulty swallowing.  Resp: No coughing, wheezing or shortness of breath  GI: No nausea, vomiting,  heartburn, abdominal pain, diarrhea, constipation or change in bowel habits  : No urinary frequency or dysuria, bladder or kidney problems  Musculoskeletal: No significant muscle or joint pains  Neurologic: No headaches, numbness, tingling, weakness, problems with balance or coordination  Psychiatric: No problems with anxiety, depression or mental health  Heme/immune/allergy: No history of bleeding or clotting problems or anemia.  No allergies or immune system problems  Integumentary: No rashes,worrisome lesions or skin problems      Physical examination  Vitals: /56 (BP Location: Left arm, Patient Position: Chair, Cuff Size: Adult Regular)  Pulse 51  Ht 1.676 m (5' 6\")  Wt 65.3 kg (144 lb)  SpO2 98%  BMI 23.24 kg/m2  BMI= Body mass index is 23.24 kg/(m^2).    Constitutional: In general, the patient is a pleasant female in no apparent distress.    Eyes: PERRLA.  EOMI.  " Sclerae white, not injected.  ENT/mouth: Normiocephalic and atraumatic.  Nares clear.  Pharynx without erythema or exudate.  Dentition intact.  No adenopathy.  No thyromegaly. Carotids +2/2 bilaterally without bruits.  No jugular venous distension.   Card/Vasc: The PMI is in the 5th ICS in the midclavicular line. There is no heave. Regular rate and rhythm. Normal S1, S2. No murmur, rub, click, or gallop. Pulses are normal bilaterally throughout. No peripheral edema.  Respiratory: Clear to asculation.  No ronchi, wheezes, rales.  No dullness to percussion.   GI: Abdomen is soft, nontender, nondistended. No organomegaly. No AAA.  No bruits.   Integument: No significant bruises or rashes  Neurological: The neurological examination reveal a patient who was oriented to person, place, and time.    Psych: Normal  Heme/Lymph/Immun: no significant adenopathy      I have reviewed the following labs/imagin19: cr 0.69, K 4.3, hgb 12.8, plt 224K    I have personally and independently reviewed the following:  EMT strips from 18: SVT, terminated with adenosine.      Assessment :  Supraventricular tachycardia. Doing well, no recurrence since 18. Back on metoprolol without much fatigue. 90 day supply sent to mail order per patient's request. She is going to spend 6 months winter in [place] where they are 30 minutes away from any hospital. I gave her propranolol short-acting 20 mg tablets to take when she has an episode to see if it can shorten duration.      Plan:  1. Continue metoprolol succinate 20 mg qday  2. Propranolol 20 mg take as needed for palpitations  3. Offer of EPS with ablation again discussed with patient. She will let me know if she wants to proceed.    The patient is to return as needed. She will notify me via [facility] if she has any recurrences. The patient understood the treatment plan as outlined above.  There were no barriers to learning.      Penelope Simmons MD

## 2018-07-02 NOTE — PATIENT INSTRUCTIONS
"You were seen today in the Cardiovascular Clinic at the [facility].     Cardiology Providers you saw during your visit: Dr. Penelope Simmons    Diagnosis:  Supraventricular tachycardia    Results: discussed with patient    Orders:   None    Medication Changes:   Continue metoprolol succinate 25 mg once a day  Propranol 20 mg 1 pill as needed with fast heart rate      Recommendations:   None    Follow-up:   As needed         Please feel free to call me with any questions or concerns.       Praneeth Raymundo LPN     Questions and [phone].   First press #1 for the [email or web address] and then press #3 for \"Medical Questions\" to reach us Cardiology Nurses.      On Call Cardiologist for after hours or on weekends: [phone]   option #4 and ask to speak to the on-call Cardiologist.          If you need a medication refill please contact your pharmacy.  Please allow 3 business days for your refill to be completed.    "

## 2019-04-17 DIAGNOSIS — I47.10 SVT (SUPRAVENTRICULAR TACHYCARDIA) (H): ICD-10-CM

## 2019-04-21 NOTE — TELEPHONE ENCOUNTER
" metoprolol succinate (TOPROL-XL) 25 MG 24 hr tablet    Last Written Prescription Date:  7/2/18  Last Fill Quantity: 90,   # refills: 3  Last Office Visit : 7/2/18  Future Office visit: none    please clarify dose, per note 7/2/18 Iris \"   . Continue metoprolol succinate 20 mg qday\"    25mg on med lis.  t 20 or 25 mg?  F/u prn. So how many rfs?    "

## 2019-04-26 RX ORDER — METOPROLOL SUCCINATE 25 MG/1
25 TABLET, EXTENDED RELEASE ORAL DAILY
Qty: 90 TABLET | Refills: 3 | Status: SHIPPED | OUTPATIENT
Start: 2019-04-26 | End: 2020-07-13

## 2019-05-16 ENCOUNTER — HOSPITAL ENCOUNTER (OUTPATIENT)
Dept: NEUROLOGY | Facility: CLINIC | Age: 84
Setting detail: THERAPIES SERIES
Discharge: STILL ADMITTED OR ADMINISTRATIVE | End: 2019-05-16
Attending: PSYCHOLOGIST

## 2019-05-16 DIAGNOSIS — F43.23 ADJUSTMENT DISORDER WITH MIXED ANXIETY AND DEPRESSED MOOD: ICD-10-CM

## 2019-06-21 ENCOUNTER — HOSPITAL ENCOUNTER (OUTPATIENT)
Dept: NEUROLOGY | Facility: CLINIC | Age: 84
Setting detail: THERAPIES SERIES
Discharge: STILL ADMITTED OR ADMINISTRATIVE | End: 2019-06-21
Attending: PSYCHOLOGIST

## 2019-06-21 DIAGNOSIS — F43.23 ADJUSTMENT DISORDER WITH MIXED ANXIETY AND DEPRESSED MOOD: ICD-10-CM

## 2019-07-18 ENCOUNTER — HOSPITAL ENCOUNTER (OUTPATIENT)
Dept: NEUROLOGY | Facility: CLINIC | Age: 84
Setting detail: THERAPIES SERIES
Discharge: STILL ADMITTED OR ADMINISTRATIVE | End: 2019-07-18
Attending: PSYCHOLOGIST

## 2019-07-18 DIAGNOSIS — F43.21 ADJUSTMENT DISORDER WITH DEPRESSED MOOD IN REMISSION: ICD-10-CM

## 2019-07-18 DIAGNOSIS — F43.22 ADJUSTMENT DISORDER WITH ANXIOUS MOOD IN REMISSION: ICD-10-CM

## 2019-09-30 ENCOUNTER — HEALTH MAINTENANCE LETTER (OUTPATIENT)
Age: 84
End: 2019-09-30

## 2019-12-15 ENCOUNTER — HEALTH MAINTENANCE LETTER (OUTPATIENT)
Age: 84
End: 2019-12-15

## 2020-07-09 DIAGNOSIS — I47.10 SVT (SUPRAVENTRICULAR TACHYCARDIA) (H): ICD-10-CM

## 2020-07-13 RX ORDER — METOPROLOL SUCCINATE 25 MG/1
25 TABLET, EXTENDED RELEASE ORAL DAILY
Qty: 90 TABLET | Refills: 0 | Status: SHIPPED | OUTPATIENT
Start: 2020-07-13

## 2020-07-13 NOTE — TELEPHONE ENCOUNTER
metoprolol succinate ER (TOPROL-XL) 25 MG 24 hr tablet    Last Written Prescription Date:  4/26/2019  Last Fill Quantity: 90,   # refills: 3  Last Office Visit : 7/2/2018  Future Office visit:  None    Routing refill request to provider for review/approval because:  Over Due years since last visit??  (July 2018)  Refer to clinic for review and refills per Providers orders.    Filomena Gallegos RN  Central Triage Red Flags/Med Refills

## 2020-07-13 NOTE — TELEPHONE ENCOUNTER
90 day RX sent.  No further refills.   Patient to get refills from PCP.    [employer] message sent to patient.    TAIWO SchroederN, RN, PHN  Electrophysiology Nurse Coordinator

## 2020-09-04 DIAGNOSIS — I47.10 SVT (SUPRAVENTRICULAR TACHYCARDIA) (H): ICD-10-CM

## 2020-09-09 RX ORDER — METOPROLOL SUCCINATE 25 MG/1
25 TABLET, EXTENDED RELEASE ORAL DAILY
Qty: 90 TABLET | Refills: 0 | OUTPATIENT
Start: 2020-09-09

## 2020-09-09 NOTE — TELEPHONE ENCOUNTER
PCP to refill:  Pharm called  Note on last RX:Further refills need to come from primary care  PCP  :  Sanjuanita Irving MD    [address] [place] [address]    [phone] (Fax)

## 2020-10-05 DIAGNOSIS — I47.10 SVT (SUPRAVENTRICULAR TACHYCARDIA) (H): ICD-10-CM

## 2020-10-06 RX ORDER — METOPROLOL SUCCINATE 25 MG/1
25 TABLET, EXTENDED RELEASE ORAL DAILY
Qty: 90 TABLET | Refills: 0 | OUTPATIENT
Start: 2020-10-06

## 2021-01-15 ENCOUNTER — HEALTH MAINTENANCE LETTER (OUTPATIENT)
Age: 86
End: 2021-01-15
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
## 2021-05-29 NOTE — PROGRESS NOTES
"Psychology Progress Note    Date: 6/21/2019    Time length and type of treatment: 6867-7624, individual therapy    Necessity: This session is necessary to address adjustment disorder with features of depression and anxiety in the context of complex medical issues.  We initiate patient's treatment plan today.  She rates her depression on the PHQ-9 depression severity scale as 9 indicating moderate depression.  Patient rates her anxiety on the SALMA-7 anxiety severity scale as 6 indicating moderate anxiety.  Today we focus on the patient's anxiety and depression treatment plan, specifically exploring discussing sleep hygiene, increasing meaningful activity, and exploring thoughts and expectations of self and others.  The reader is invited to review the patient's full treatment plan in the Media section of the patient's Epic medical record.    Intervention: Patient reports giving some thought to the possibility that increased anxiety was related to loss of purpose.  She concludes along with the observation of her wife that her anxiety was more related to adjustment to macular degeneration.  Patient describes her fear, that if her wife preceeds her in death, that she would be dependent on others.  We explore whether the patient would find it helpful to identify an assisted living that would be acceptable to reduce that anxiety.  Patient expresses uncertainty about pursuing this.    We explore sleep hygiene issues.  The patient describes \"racing thoughts\" especially at 3 AM.  Patient will trial getting out of bed and either listening to classical music or writing down a list of her thoughts and concerns using a reading light.    Patient reflects on her concern that she would prefer to be by herself than spend time with friends.  Patient indicates that it is of value to her to engage in social interaction and be with friends.  She indicates she continues to have a response of saying \"no\" when asked to do something.  We " discuss an experiment in which the patient may continue to say no and in the same phone call schedule a time when she would meet with this individual.  We discuss how this might satisfy her need for control and desire to have social interaction and maintain friendships.  Patient is in agreement with this plan.    Patient indicates a desire at our next session to discuss or explore whether the patient has regrets about her life experience.  Patient is encouraged to write down thoughts that come to her over the next month and bring them to her next session.  She is in agreement with this plan.    This writer utilized motivational interviewing, active listening, reassurance and support in the context of cognitive behavioral therapy and ACT therapy to address the above.      Mental Status: Orientation to person, place, and time is in tact.  Recent and remote memory, attention, concentration, language, and fund of knowledge are intact.  Mood appears stable with mildly anxious affect.  No indication of suicidal ideation, plan, or intent.  No indication of homicidal ideation, plan or intent.    Progress: Patient reports being relatively comfortable with her current routine and activities.  She does not report a loss of purpose.  Patient attributes her anxiety to adjusting to macular degeneration.  Progress is good with exploring the nature of her anxiety and contributing factors.    Plan:   Patient will return in one month where we will continue with cognitive behavioral therapy to promote adaptive coping with living with macular degeneration with the possibility of loss of independence.  Expected duration of treatment would be 4-6 sessions (16328) at 1 month intervals.  Treatment is expected to be completed by 12/31/2019.      Diagnosis: Adjustment disorder with depressed and anxious mood

## 2021-05-30 NOTE — PROGRESS NOTES
"Psychology Progress Note    Date: 7/18/2019    Time length and type of treatment: 1100 1150, individual therapy    Necessity: This session is necessary to address adjustment issues with features of depression and anxiety in the context of complex medical issues.  Today we focus on the patient's depression and anxiety treatment plan, specifically exploring processing grief, increasing meaningful activity, and exploring thoughts and expectations of self and others.  The reader is invited to review the patient's full treatment plan in the Media section of the patient's Epic medical record.    Intervention: Patient reports \" I have had an attitude change\".  She describes approaching her aging as a \"new adventure\".  Patient describes a book that was helpful to her called the art of dying.  We explore the value of role modeling for others graceful aging.  This writer suggests  the book by Cesar Malloy, the end of old age.  In particular, this book suggests a structure for writing about past experiences and planning for how to move forward.    Patient reports sleeping better.  She is uncertain what to attribute this to.  Patient also reports increasing social interaction.    This writer utilized motivational interviewing, active listening, reassurance and support in the context of cognitive behavioral therapy and ACT therapy to address the above.      Mental Status: Orientation to person, place, and time is in tact.  Recent and remote memory, attention, concentration, language, and fund of knowledge are intact.  Mood appears stable with pleasant affect.  No indication of suicidal ideation, plan, or intent.  No indication of homicidal ideation, plan or intent.    Progress: Patient reports doing well.  Progress is good with resuming better sleep, increasing social interaction, and gaining perspective about the meaning of this time of life.    Plan: We mutually agreed to end psychology services at this time.  We discussed how " the patient might initiate services in the future should the need arise.      Diagnosis: Adjustment disorder with depressed and anxious mood, in remission

## 2021-10-24 ENCOUNTER — HEALTH MAINTENANCE LETTER (OUTPATIENT)
Age: 86
End: 2021-10-24

## 2022-02-13 ENCOUNTER — HEALTH MAINTENANCE LETTER (OUTPATIENT)
Age: 87
End: 2022-02-13

## 2022-10-16 ENCOUNTER — HEALTH MAINTENANCE LETTER (OUTPATIENT)
Age: 87
End: 2022-10-16

## 2023-03-26 ENCOUNTER — HEALTH MAINTENANCE LETTER (OUTPATIENT)
Age: 88
End: 2023-03-26